# Patient Record
Sex: FEMALE | Race: BLACK OR AFRICAN AMERICAN | URBAN - METROPOLITAN AREA
[De-identification: names, ages, dates, MRNs, and addresses within clinical notes are randomized per-mention and may not be internally consistent; named-entity substitution may affect disease eponyms.]

---

## 2020-08-14 ENCOUNTER — ERX REFILL RESPONSE (OUTPATIENT)
Dept: URBAN - METROPOLITAN AREA TELEHEALTH 2 | Facility: TELEHEALTH | Age: 40
End: 2020-08-14

## 2020-08-14 ENCOUNTER — OFFICE VISIT (OUTPATIENT)
Dept: URBAN - METROPOLITAN AREA TELEHEALTH 2 | Facility: TELEHEALTH | Age: 40
End: 2020-08-14
Payer: MEDICARE

## 2020-08-14 ENCOUNTER — LAB OUTSIDE AN ENCOUNTER (OUTPATIENT)
Dept: URBAN - METROPOLITAN AREA TELEHEALTH 2 | Facility: TELEHEALTH | Age: 40
End: 2020-08-14

## 2020-08-14 DIAGNOSIS — Z91.89 COLON CANCER HIGH RISK: ICD-10-CM

## 2020-08-14 DIAGNOSIS — R11.2 NAUSEA & VOMITING: ICD-10-CM

## 2020-08-14 DIAGNOSIS — R10.84 ABDOMINAL PAIN, GENERALIZED: ICD-10-CM

## 2020-08-14 DIAGNOSIS — K50.80 CROHN'S DISEASE OF BOTH SMALL AND LARGE INTESTINE WITHOUT COMPLICATION: ICD-10-CM

## 2020-08-14 DIAGNOSIS — Z79.899 LONG-TERM USE OF IMMUNOSUPPRESSANT MEDICATION: ICD-10-CM

## 2020-08-14 DIAGNOSIS — R93.5 ABNORMAL MRI OF ABDOMEN: ICD-10-CM

## 2020-08-14 PROCEDURE — G9622 NO UNHEAL ETOH USER: HCPCS | Performed by: INTERNAL MEDICINE

## 2020-08-14 PROCEDURE — G8427 DOCREV CUR MEDS BY ELIG CLIN: HCPCS | Performed by: INTERNAL MEDICINE

## 2020-08-14 PROCEDURE — 1036F TOBACCO NON-USER: CPT | Performed by: INTERNAL MEDICINE

## 2020-08-14 PROCEDURE — G8420 CALC BMI NORM PARAMETERS: HCPCS | Performed by: INTERNAL MEDICINE

## 2020-08-14 PROCEDURE — 3017F COLORECTAL CA SCREEN DOC REV: CPT | Performed by: INTERNAL MEDICINE

## 2020-08-14 PROCEDURE — G8482 FLU IMMUNIZE ORDER/ADMIN: HCPCS | Performed by: INTERNAL MEDICINE

## 2020-08-14 PROCEDURE — G9903 PT SCRN TBCO ID AS NON USER: HCPCS | Performed by: INTERNAL MEDICINE

## 2020-08-14 PROCEDURE — 99215 OFFICE O/P EST HI 40 MIN: CPT | Performed by: INTERNAL MEDICINE

## 2020-08-14 RX ORDER — PROMETHAZINE HYDROCHLORIDE 25 MG/1
TAKE 1 TABLET (25 MG) BY ORAL ROUTE EVERY 6 HOURS AS NEEDED FOR 90 DAYS TABLET ORAL
Qty: 200 | Refills: 3 | Status: ACTIVE | COMMUNITY
Start: 2019-12-09 | End: 2020-12-03

## 2020-08-14 RX ORDER — OMEPRAZOLE 40 MG/1
1 CAPSULE 30 MINUTES BEFORE MORNING MEAL CAPSULE, DELAYED RELEASE ORAL BID
Qty: 60 | OUTPATIENT
Start: 2020-08-14

## 2020-08-14 RX ORDER — NORTRIPTYLINE HYDROCHLORIDE 25 MG/1
TAKE 2 CAPSULES BY ORAL ROUTE ONCE A DAY (AT BEDTIME) FOR 90 DAYS CAPSULE ORAL 1
Qty: 180 | Refills: 4 | Status: ACTIVE | COMMUNITY
Start: 2020-01-22 | End: 2021-04-16

## 2020-08-14 RX ORDER — SODIUM, POTASSIUM,MAG SULFATES 17.5-3.13G
354 ML SOLUTION, RECONSTITUTED, ORAL ORAL ONCE
Qty: 354 MILLILITER | Refills: 0 | OUTPATIENT
Start: 2020-08-14 | End: 2020-08-15

## 2020-08-14 RX ORDER — PREDNISONE 10 MG/1
2 TABLETS TABLET ORAL ONCE A DAY
Qty: 60 | Refills: 0 | OUTPATIENT
Start: 2020-08-14 | End: 2020-09-13

## 2020-08-14 RX ORDER — MORPHINE 10 MG/ML
5 DROPS TINCTURE ORAL
Qty: 600 ML | Refills: 0 | OUTPATIENT
Start: 2020-08-14

## 2020-08-14 RX ORDER — CYANOCOBALAMIN 1000 UG/ML
INJECT 1ML ONCE WEEK FOR 30 DAYS INJECTION INTRAMUSCULAR; SUBCUTANEOUS
Qty: 4 | Refills: 4 | Status: ACTIVE | COMMUNITY
Start: 2018-05-30 | End: 1900-01-01

## 2020-08-14 RX ORDER — OMEPRAZOLE 40 MG/1
1 CAPSULE 30 MINUTES BEFORE MORNING MEAL CAPSULE, DELAYED RELEASE ORAL BID
Qty: 60 | Refills: 0 | OUTPATIENT

## 2020-08-14 RX ORDER — DIPHENOXYLATE HYDROCHLORIDE AND ATROPINE SULFATE 2.5; .025 MG/1; MG/1
1 TABLET AS NEEDED TABLET ORAL
Qty: 120 | Refills: 3 | OUTPATIENT
Start: 2020-08-14

## 2020-08-14 RX ORDER — DIPHENOXYLATE HCL/ATROPINE 2.5-.025MG
TAKE 2 TABLETS BY ORAL ROUTE 3 TIMES A DAY FOR 90 DAYS TABLET ORAL
Qty: 540 | Refills: 3 | Status: ACTIVE | COMMUNITY
Start: 2020-04-29 | End: 2021-04-24

## 2020-08-14 RX ORDER — OMEPRAZOLE 40 MG/1
1 CAPSULE 30 MINUTES BEFORE MORNING MEAL BID ORALLY 90 CAPSULE, DELAYED RELEASE ORAL
Qty: 60 CAPSULE | Refills: 0 | OUTPATIENT

## 2020-08-14 NOTE — HPI-OTHER HISTORIES
. Ms Cota is a 38 y/o individual, with ileal CD, currently on Humira (every week currently since ), here for follow-up of her condition, here f/u. . She is referred by Dr. Brittanie Flannery. . She was diagnosed in  with sxs of vomiting.  She had first surgery at age 5 for bowel obstruction, intestinal obstruction was found and had resection. She had the next surgery in  at [place], after presenting with obstructions.  She had ileal resection performed at this time and was diagnosed with Crohn s disease.  At that time she was started on Remicade.  She used this for about 1 year, but had minimal improvement.  She was then started on Humira since .  This has worked for her. . Previously on 10/22/2018, she reports that she continues to get frequent hospitalizations with sxs of diarrhea, nausea, vomiting, and abdominal pain.  When she has a flare, she often loses weight.  She also has shortgut syndrome.  When she has a flare, she often takes prednisone.  Her last hospitalization was in 2018.  She reports that she has 2-3 BM s per day, but this can vary depending on what she eats.  She has diffuse abdominal pain, worse if she eats, nothing makes it better or worse other than time.  Has injection site pain with Humira.  Tincture of opium is way she maintains diarrheal sxs, without it, she has continuous diarrhea. . She had a colonoscopy 10/31/2018, which was normal. . Previously on 2018, she reports that she has had some post-procedure pain and some rectal bleeding.  She has significant amount of pain on thighs after injection of Humira. . Previously on 2019, she reports that she has daily nausea, but no flares.  Zofran and Phenergan helps her the most.  We attempted to switch to every other week Humira, however, she has developed joint swelling and pain. . Previously on 3/27/2019, she has severe diarrhea.  She developed shingles and we stopped her Humira for the past 2 weeks.  She has severe nerve pain on her right chest wall, started on gabapentin by doctor.  She has up to 15 BM with incontinence.  Minimal improvement with Lomotil and opium tincture has run out due to increased dosage requirement.  Significant stress, son had seizure and other son lacerated liver after falling off bike. . Previously on 2019, she reports that she was hospitalized for nausea, vomiting and diarrhea.  Her big issues is daily nausea, however, THC (helps with diarrhea) and CBD oil helps with nausea. . Previously on 2020, pt report that she has been noticing that her BP has been elevated at home checks.  She continues to have nausea and abdominal pain.  She has nausea on a daily basis and has lost weight because of this.  CBD oils is helping with nausea, however, it impairs her mental status and prevents her from being able to drive her car.  No diarrhea.  No joint swelling, doing well on Humira. . Previously on 2020, pt reports she stopped taking her THC (when kids stopped school).  However, she got severely dehydrated due to this.  She has lost 7 lbs (was having lot of nausea and diarrhea).  Was tested for c diff.  Was started on prednisone (by ER) and was able to taper off of this.  CBD oil helps with nausea. . Today on 2020, pt reports she had a severe flare, went to ER for severe and out of control pain, 10 on scale of 10.  Having severe radiation of sxs, nothing makes it better or worse.  Had associated nausea as well.  She was given 3 doses of IV dilaudid, steroids.  No heartburn.  Uncontrolled diarrhea.  She was given flagyl in the ER, but did not get a stool culture performed.  The flagyl has helped somewhat. . Sees pain management for pain. . Labs: 2020: hgb 11.8, Wbc 4.3, ast 13, alt 5, iron sat 49, quant-gold (re-draw), b12 490, ferritin 109,  2019: Hgb 12.4, Ast 12, Alt 11, Cr 0.97 10/26/2018: Hgb 13.5, Cr 1.03, Ast 14, Alt 9, Iron sat 46, Ferritin 124; B12 >2000; vit D 12.2 Crp 0.2; esr 2 Humira 32  Quant-gold neg; hep B immune . FH: Aunt () with CD, brother with UC SH: No etoh/cig; disabled lives in [facility] PHM: LESTER, 2 small bowel resections, endometriosis, and scar tissue  . 10/31/2018: (Dr. Seals) - Normal mucosa in the recto-sigmoid colon, in the sigmoid colon, in the descending colon, at the splenic flexure, in the transverse colon, at the hepatic flexure and in the ascending colon.  Biopsied. - The examined portion of the ileum was normal.  Biopsied. - The ileocecal valve is normal.  Biopsied. . 2016: Colonoscopy was normal with normal biopsies from ileum and colon .

## 2020-08-21 ENCOUNTER — TELEPHONE ENCOUNTER (OUTPATIENT)
Dept: URBAN - METROPOLITAN AREA CLINIC 92 | Facility: CLINIC | Age: 40
End: 2020-08-21

## 2020-09-17 ENCOUNTER — OFFICE VISIT (OUTPATIENT)
Dept: URBAN - METROPOLITAN AREA SURGERY CENTER 18 | Facility: SURGERY CENTER | Age: 40
End: 2020-09-17
Payer: MEDICARE

## 2020-09-17 ENCOUNTER — TELEPHONE ENCOUNTER (OUTPATIENT)
Dept: URBAN - METROPOLITAN AREA CLINIC 92 | Facility: CLINIC | Age: 40
End: 2020-09-17

## 2020-09-17 DIAGNOSIS — K29.30 CHRONIC SUPERFICIAL GASTRITIS WITHOUT BLEEDING: ICD-10-CM

## 2020-09-17 DIAGNOSIS — K50.10 CROHN'S COLITIS: ICD-10-CM

## 2020-09-17 PROCEDURE — 45380 COLONOSCOPY AND BIOPSY: CPT | Performed by: INTERNAL MEDICINE

## 2020-09-17 PROCEDURE — 43239 EGD BIOPSY SINGLE/MULTIPLE: CPT | Performed by: INTERNAL MEDICINE

## 2020-09-17 PROCEDURE — G8907 PT DOC NO EVENTS ON DISCHARG: HCPCS | Performed by: INTERNAL MEDICINE

## 2020-09-17 PROCEDURE — G9937 DIG OR SURV COLSCO: HCPCS | Performed by: INTERNAL MEDICINE

## 2020-09-17 RX ORDER — DICYCLOMINE HYDROCHLORIDE 20 MG/1
2 CAPSULES TABLET ORAL
Qty: 720 CAPSULE | Refills: 4 | OUTPATIENT
Start: 2020-09-17 | End: 2021-12-10

## 2020-09-17 RX ORDER — DIPHENOXYLATE HCL/ATROPINE 2.5-.025MG
TAKE 2 TABLETS BY ORAL ROUTE 3 TIMES A DAY FOR 90 DAYS TABLET ORAL
Qty: 540 | Refills: 3 | Status: ACTIVE | COMMUNITY
Start: 2020-04-29 | End: 2021-04-24

## 2020-09-17 RX ORDER — OMEPRAZOLE 40 MG/1
1 CAPSULE 30 MINUTES BEFORE MORNING MEAL BID ORALLY 90 CAPSULE, DELAYED RELEASE ORAL
Qty: 60 CAPSULE | Refills: 0 | Status: ACTIVE | COMMUNITY

## 2020-09-17 RX ORDER — PROMETHAZINE HYDROCHLORIDE 25 MG/1
TAKE 1 TABLET (25 MG) BY ORAL ROUTE EVERY 6 HOURS AS NEEDED FOR 90 DAYS TABLET ORAL
Qty: 200 | Refills: 3 | Status: ACTIVE | COMMUNITY
Start: 2019-12-09 | End: 2020-12-03

## 2020-09-17 RX ORDER — MORPHINE 10 MG/ML
5 DROPS TINCTURE ORAL
Qty: 600 ML | Refills: 0 | Status: ACTIVE | COMMUNITY
Start: 2020-08-14

## 2020-09-17 RX ORDER — NORTRIPTYLINE HYDROCHLORIDE 25 MG/1
TAKE 2 CAPSULES BY ORAL ROUTE ONCE A DAY (AT BEDTIME) FOR 90 DAYS CAPSULE ORAL 1
Qty: 180 | Refills: 4 | Status: ACTIVE | COMMUNITY
Start: 2020-01-22 | End: 2021-04-16

## 2020-09-17 RX ORDER — DIPHENOXYLATE HYDROCHLORIDE AND ATROPINE SULFATE 2.5; .025 MG/1; MG/1
1 TABLET AS NEEDED TABLET ORAL
Qty: 120 | Refills: 3 | Status: ACTIVE | COMMUNITY
Start: 2020-08-14

## 2020-09-17 RX ORDER — MORPHINE 10 MG/ML
5 DROPS TINCTURE ORAL
Qty: 600 ML | Refills: 0
Start: 2020-08-14

## 2020-09-17 RX ORDER — OMEPRAZOLE 40 MG/1
1 CAPSULE CAPSULE, DELAYED RELEASE ORAL BID
Qty: 180 | Refills: 4 | OUTPATIENT
Start: 2020-09-17

## 2020-09-17 RX ORDER — CYANOCOBALAMIN 1000 UG/ML
INJECT 1ML ONCE WEEK FOR 30 DAYS INJECTION INTRAMUSCULAR; SUBCUTANEOUS
Qty: 4 | Refills: 4 | Status: ACTIVE | COMMUNITY
Start: 2018-05-30 | End: 1900-01-01

## 2020-09-18 ENCOUNTER — WEB ENCOUNTER (OUTPATIENT)
Dept: URBAN - METROPOLITAN AREA CLINIC 96 | Facility: CLINIC | Age: 40
End: 2020-09-18

## 2020-09-18 ENCOUNTER — OFFICE VISIT (OUTPATIENT)
Dept: URBAN - METROPOLITAN AREA TELEHEALTH 2 | Facility: TELEHEALTH | Age: 40
End: 2020-09-18
Payer: MEDICARE

## 2020-09-18 ENCOUNTER — WEB ENCOUNTER (OUTPATIENT)
Dept: URBAN - METROPOLITAN AREA CLINIC 98 | Facility: CLINIC | Age: 40
End: 2020-09-18

## 2020-09-18 DIAGNOSIS — R10.9 ABDOMINAL PAIN: ICD-10-CM

## 2020-09-18 DIAGNOSIS — E86.0 DEHYDRATION: ICD-10-CM

## 2020-09-18 DIAGNOSIS — R11.2 NAUSEA & VOMITING: ICD-10-CM

## 2020-09-18 DIAGNOSIS — K50.80 CROHN'S DISEASE OF BOTH SMALL AND LARGE INTESTINE WITHOUT COMPLICATION: ICD-10-CM

## 2020-09-18 PROCEDURE — 99214 OFFICE O/P EST MOD 30 MIN: CPT | Performed by: INTERNAL MEDICINE

## 2020-09-18 PROCEDURE — G9903 PT SCRN TBCO ID AS NON USER: HCPCS | Performed by: INTERNAL MEDICINE

## 2020-09-18 PROCEDURE — 3017F COLORECTAL CA SCREEN DOC REV: CPT | Performed by: INTERNAL MEDICINE

## 2020-09-18 PROCEDURE — 1036F TOBACCO NON-USER: CPT | Performed by: INTERNAL MEDICINE

## 2020-09-18 PROCEDURE — G8427 DOCREV CUR MEDS BY ELIG CLIN: HCPCS | Performed by: INTERNAL MEDICINE

## 2020-09-18 PROCEDURE — G8482 FLU IMMUNIZE ORDER/ADMIN: HCPCS | Performed by: INTERNAL MEDICINE

## 2020-09-18 PROCEDURE — G9622 NO UNHEAL ETOH USER: HCPCS | Performed by: INTERNAL MEDICINE

## 2020-09-18 PROCEDURE — G8420 CALC BMI NORM PARAMETERS: HCPCS | Performed by: INTERNAL MEDICINE

## 2020-09-18 RX ORDER — OMEPRAZOLE 40 MG/1
1 CAPSULE 30 MINUTES BEFORE MORNING MEAL BID ORALLY 90 CAPSULE, DELAYED RELEASE ORAL
Qty: 60 CAPSULE | Refills: 0 | Status: ACTIVE | COMMUNITY

## 2020-09-18 RX ORDER — DIPHENOXYLATE HYDROCHLORIDE AND ATROPINE SULFATE 2.5; .025 MG/1; MG/1
1 TABLET AS NEEDED TABLET ORAL
Qty: 120 | Refills: 3 | Status: ACTIVE | COMMUNITY
Start: 2020-08-14

## 2020-09-18 RX ORDER — NORTRIPTYLINE HYDROCHLORIDE 25 MG/1
1 CAPSULE CAPSULE ORAL QPM
Qty: 30 | Refills: 4 | OUTPATIENT
Start: 2020-09-18

## 2020-09-18 RX ORDER — DIPHENOXYLATE HYDROCHLORIDE AND ATROPINE SULFATE 2.5; .025 MG/1; MG/1
1 TABLET AS NEEDED TABLET ORAL
Qty: 120 | Refills: 3 | OUTPATIENT

## 2020-09-18 RX ORDER — MORPHINE 10 MG/ML
5 DROPS TINCTURE ORAL
Qty: 600 ML | Refills: 0 | Status: ACTIVE | COMMUNITY
Start: 2020-08-14

## 2020-09-18 RX ORDER — MORPHINE 10 MG/ML
5 DROPS TINCTURE ORAL
Qty: 600 ML | Refills: 0 | OUTPATIENT

## 2020-09-18 RX ORDER — PROMETHAZINE HYDROCHLORIDE 25 MG/1
TAKE 1 TABLET (25 MG) BY ORAL ROUTE EVERY 6 HOURS AS NEEDED FOR 90 DAYS TABLET ORAL
Qty: 200 | Refills: 3 | Status: ACTIVE | COMMUNITY
Start: 2019-12-09 | End: 2020-12-03

## 2020-09-18 RX ORDER — DICYCLOMINE HYDROCHLORIDE 20 MG/1
2 CAPSULES TABLET ORAL
Qty: 720 CAPSULE | Refills: 4 | Status: ACTIVE | COMMUNITY
Start: 2020-09-17 | End: 2021-12-10

## 2020-09-18 RX ORDER — OMEPRAZOLE 40 MG/1
1 CAPSULE CAPSULE, DELAYED RELEASE ORAL BID
Qty: 180 | Refills: 4 | Status: ACTIVE | COMMUNITY
Start: 2020-09-17

## 2020-09-18 RX ORDER — NORTRIPTYLINE HYDROCHLORIDE 25 MG/1
TAKE 2 CAPSULES BY ORAL ROUTE ONCE A DAY (AT BEDTIME) FOR 90 DAYS CAPSULE ORAL 1
Qty: 180 | Refills: 4 | Status: ACTIVE | COMMUNITY
Start: 2020-01-22 | End: 2021-04-16

## 2020-09-18 RX ORDER — CYANOCOBALAMIN 1000 UG/ML
INJECT 1ML ONCE WEEK FOR 30 DAYS INJECTION INTRAMUSCULAR; SUBCUTANEOUS
Qty: 4 | Refills: 4 | Status: ACTIVE | COMMUNITY
Start: 2018-05-30 | End: 1900-01-01

## 2020-09-18 RX ORDER — OMEPRAZOLE 40 MG/1
1 CAPSULE ORALLY BID CAPSULE, DELAYED RELEASE ORAL
Qty: 180 | Refills: 4 | OUTPATIENT

## 2020-09-18 RX ORDER — DIPHENOXYLATE HCL/ATROPINE 2.5-.025MG
TAKE 2 TABLETS BY ORAL ROUTE 3 TIMES A DAY FOR 90 DAYS TABLET ORAL
Qty: 540 | Refills: 3 | Status: ACTIVE | COMMUNITY
Start: 2020-04-29 | End: 2021-04-24

## 2020-09-18 NOTE — HPI-OTHER HISTORIES
. Ms Cota is a 41 y/o individual, with ileal CD, currently on Humira (every week currently since ), here for follow-up of her condition, here f/u. . She is referred by Dr. Brittanie Flannery. . She was diagnosed in  with sxs of vomiting.  She had first surgery at age 5 for bowel obstruction, intestinal obstruction was found and had resection. She had the next surgery in  at [place], after presenting with obstructions.  She had ileal resection performed at this time and was diagnosed with Crohn s disease.  At that time she was started on Remicade.  She used this for about 1 year, but had minimal improvement.  She was then started on Humira since .  This has worked for her. . Previously on 10/22/2018, she reports that she continues to get frequent hospitalizations with sxs of diarrhea, nausea, vomiting, and abdominal pain.  When she has a flare, she often loses weight.  She also has shortgut syndrome.  When she has a flare, she often takes prednisone.  Her last hospitalization was in 2018.  She reports that she has 2-3 BM s per day, but this can vary depending on what she eats.  She has diffuse abdominal pain, worse if she eats, nothing makes it better or worse other than time.  Has injection site pain with Humira.  Tincture of opium is way she maintains diarrheal sxs, without it, she has continuous diarrhea. . She had a colonoscopy 10/31/2018, which was normal. . Previously on 2018, she reports that she has had some post-procedure pain and some rectal bleeding.  She has significant amount of pain on thighs after injection of Humira. . Previously on 2019, she reports that she has daily nausea, but no flares.  Zofran and Phenergan helps her the most.  We attempted to switch to every other week Humira, however, she has developed joint swelling and pain. . Previously on 3/27/2019, she has severe diarrhea.  She developed shingles and we stopped her Humira for the past 2 weeks.  She has severe nerve pain on her right chest wall, started on gabapentin by doctor.  She has up to 15 BM with incontinence.  Minimal improvement with Lomotil and opium tincture has run out due to increased dosage requirement.  Significant stress, son had seizure and other son lacerated liver after falling off bike. . Previously on 2019, she reports that she was hospitalized for nausea, vomiting and diarrhea.  Her big issues is daily nausea, however, THC (helps with diarrhea) and CBD oil helps with nausea. . Previously on 2020, pt report that she has been noticing that her BP has been elevated at home checks.  She continues to have nausea and abdominal pain.  She has nausea on a daily basis and has lost weight because of this.  CBD oils is helping with nausea, however, it impairs her mental status and prevents her from being able to drive her car.  No diarrhea.  No joint swelling, doing well on Humira. . Previously on 2020, pt reports she stopped taking her THC (when kids stopped school).  However, she got severely dehydrated due to this.  She has lost 7 lbs (was having lot of nausea and diarrhea).  Was tested for c diff.  Was started on prednisone (by ER) and was able to taper off of this.  CBD oil helps with nausea. . Previously on 2020, pt reports she had a severe flare, went to ER for severe and out of control pain, 10 on scale of 10.  Having severe radiation of sxs, nothing makes it better or worse.  Had associated nausea as well.  She was given 3 doses of IV dilaudid, steroids.  No heartburn.  Uncontrolled diarrhea.  She was given flagyl in the ER, but did not get a stool culture performed.  The flagyl has helped somewhat. . Today on 2020, pt reports that after procedure she had severe onset of abdominal pain, nausea and vomiting.  She went to ER, they did CT scan, which was negative.  They could not get IV access on her, due to the dehydration.   . Sees pain management for pain. . Labs: 2020: hgb 11.8, Wbc 4.3, ast 13, alt 5, iron sat 49, quant-gold (re-draw), b12 490, ferritin 109,  2019: Hgb 12.4, Ast 12, Alt 11, Cr 0.97 10/26/2018: Hgb 13.5, Cr 1.03, Ast 14, Alt 9, Iron sat 46, Ferritin 124; B12 >2000; vit D 12.2 Crp 0.2; esr 2 Humira 32  Quant-gold neg; hep B immune . FH: Aunt () with CD, brother with UC SH: No etoh/cig; disabled lives in [facility] PHM: LESTER, 2 small bowel resections, endometriosis, and scar tissue  . 10/31/2018: (Dr. Seals) - Normal mucosa in the recto-sigmoid colon, in the sigmoid colon, in the descending colon, at the splenic flexure, in the transverse colon, at the hepatic flexure and in the ascending colon.  Biopsied. - The examined portion of the ileum was normal.  Biopsied. - The ileocecal valve is normal.  Biopsied. . 2016: Colonoscopy was normal with normal biopsies from ileum and colon .

## 2020-09-21 ENCOUNTER — TELEPHONE ENCOUNTER (OUTPATIENT)
Dept: URBAN - METROPOLITAN AREA CLINIC 92 | Facility: CLINIC | Age: 40
End: 2020-09-21

## 2020-09-21 RX ORDER — OMEPRAZOLE 40 MG/1
1 CAPSULE 30 MINUTES BEFORE MORNING MEAL BID ORALLY 90 CAPSULE, DELAYED RELEASE ORAL
Qty: 60 CAPSULE | Refills: 0 | Status: ACTIVE | COMMUNITY

## 2020-09-21 RX ORDER — PROMETHAZINE HYDROCHLORIDE 25 MG/1
TAKE 1 TABLET (25 MG) BY ORAL ROUTE EVERY 6 HOURS AS NEEDED FOR 90 DAYS TABLET ORAL
Qty: 200 | Refills: 3 | Status: ACTIVE | COMMUNITY
Start: 2019-12-09 | End: 2020-12-03

## 2020-09-21 RX ORDER — OMEPRAZOLE 40 MG/1
1 CAPSULE ORALLY BID CAPSULE, DELAYED RELEASE ORAL
Qty: 180 | Refills: 4 | Status: ACTIVE | COMMUNITY

## 2020-09-21 RX ORDER — NORTRIPTYLINE HYDROCHLORIDE 25 MG/1
TAKE 2 CAPSULES BY ORAL ROUTE ONCE A DAY (AT BEDTIME) FOR 90 DAYS CAPSULE ORAL 1
Qty: 180 | Refills: 4 | Status: ACTIVE | COMMUNITY
Start: 2020-01-22 | End: 2021-04-16

## 2020-09-21 RX ORDER — OMEPRAZOLE 40 MG/1
1 CAPSULE CAPSULE, DELAYED RELEASE ORAL BID
Qty: 60 CAPSULE | Refills: 11
Start: 2020-09-17

## 2020-09-21 RX ORDER — PREDNISONE 10 MG/1
2 TABLETS TABLET ORAL ONCE A DAY
Qty: 60 | Refills: 0 | OUTPATIENT
Start: 2020-09-21 | End: 2020-10-21

## 2020-09-21 RX ORDER — DICYCLOMINE HYDROCHLORIDE 20 MG/1
2 CAPSULES TABLET ORAL
Qty: 720 CAPSULE | Refills: 4 | Status: ACTIVE | COMMUNITY
Start: 2020-09-17 | End: 2021-12-10

## 2020-09-21 RX ORDER — DIPHENOXYLATE HYDROCHLORIDE AND ATROPINE SULFATE 2.5; .025 MG/1; MG/1
1 TABLET AS NEEDED TABLET ORAL
Qty: 120 | Refills: 3 | Status: ACTIVE | COMMUNITY

## 2020-09-21 RX ORDER — OMEPRAZOLE 40 MG/1
1 CAPSULE CAPSULE, DELAYED RELEASE ORAL BID
Qty: 180 | Refills: 4 | Status: ACTIVE | COMMUNITY
Start: 2020-09-17

## 2020-09-21 RX ORDER — MORPHINE 10 MG/ML
5 DROPS TINCTURE ORAL
Qty: 600 ML | Refills: 0 | Status: ACTIVE | COMMUNITY

## 2020-09-21 RX ORDER — NORTRIPTYLINE HYDROCHLORIDE 25 MG/1
1 CAPSULE CAPSULE ORAL QPM
Qty: 30 | Refills: 4 | Status: ACTIVE | COMMUNITY
Start: 2020-09-18

## 2020-09-21 RX ORDER — CYANOCOBALAMIN 1000 UG/ML
INJECT 1ML ONCE WEEK FOR 30 DAYS INJECTION INTRAMUSCULAR; SUBCUTANEOUS
Qty: 4 | Refills: 4 | Status: ACTIVE | COMMUNITY
Start: 2018-05-30 | End: 1900-01-01

## 2020-09-21 RX ORDER — DIPHENOXYLATE HCL/ATROPINE 2.5-.025MG
TAKE 2 TABLETS BY ORAL ROUTE 3 TIMES A DAY FOR 90 DAYS TABLET ORAL
Qty: 540 | Refills: 3 | Status: ACTIVE | COMMUNITY
Start: 2020-04-29 | End: 2021-04-24

## 2020-09-23 ENCOUNTER — TELEPHONE ENCOUNTER (OUTPATIENT)
Dept: URBAN - METROPOLITAN AREA CLINIC 92 | Facility: CLINIC | Age: 40
End: 2020-09-23

## 2020-09-23 RX ORDER — OMEPRAZOLE 40 MG/1
1 CAPSULE CAPSULE, DELAYED RELEASE ORAL BID
Qty: 180 | Refills: 4
Start: 2020-09-17

## 2020-10-28 ENCOUNTER — OFFICE VISIT (OUTPATIENT)
Dept: URBAN - METROPOLITAN AREA TELEHEALTH 2 | Facility: TELEHEALTH | Age: 40
End: 2020-10-28
Payer: MEDICARE

## 2020-10-28 ENCOUNTER — TELEPHONE ENCOUNTER (OUTPATIENT)
Dept: URBAN - METROPOLITAN AREA CLINIC 92 | Facility: CLINIC | Age: 40
End: 2020-10-28

## 2020-10-28 DIAGNOSIS — R11.2 NAUSEA & VOMITING: ICD-10-CM

## 2020-10-28 DIAGNOSIS — R10.84 ABDOMINAL CRAMPING, GENERALIZED: ICD-10-CM

## 2020-10-28 DIAGNOSIS — K50.80 CROHN'S DISEASE OF BOTH SMALL AND LARGE INTESTINE WITHOUT COMPLICATION: ICD-10-CM

## 2020-10-28 DIAGNOSIS — R10.9 ABDOMINAL PAIN: ICD-10-CM

## 2020-10-28 DIAGNOSIS — Z09 FOLLOW UP: ICD-10-CM

## 2020-10-28 DIAGNOSIS — E86.0 DEHYDRATION: ICD-10-CM

## 2020-10-28 DIAGNOSIS — Z79.899 LONG-TERM USE OF IMMUNOSUPPRESSANT MEDICATION: ICD-10-CM

## 2020-10-28 PROCEDURE — 99213 OFFICE O/P EST LOW 20 MIN: CPT | Performed by: INTERNAL MEDICINE

## 2020-10-28 PROCEDURE — G9903 PT SCRN TBCO ID AS NON USER: HCPCS | Performed by: INTERNAL MEDICINE

## 2020-10-28 PROCEDURE — 3017F COLORECTAL CA SCREEN DOC REV: CPT | Performed by: INTERNAL MEDICINE

## 2020-10-28 PROCEDURE — G8427 DOCREV CUR MEDS BY ELIG CLIN: HCPCS | Performed by: INTERNAL MEDICINE

## 2020-10-28 PROCEDURE — 99215 OFFICE O/P EST HI 40 MIN: CPT | Performed by: INTERNAL MEDICINE

## 2020-10-28 PROCEDURE — G8482 FLU IMMUNIZE ORDER/ADMIN: HCPCS | Performed by: INTERNAL MEDICINE

## 2020-10-28 PROCEDURE — G8420 CALC BMI NORM PARAMETERS: HCPCS | Performed by: INTERNAL MEDICINE

## 2020-10-28 PROCEDURE — G9622 NO UNHEAL ETOH USER: HCPCS | Performed by: INTERNAL MEDICINE

## 2020-10-28 PROCEDURE — 1036F TOBACCO NON-USER: CPT | Performed by: INTERNAL MEDICINE

## 2020-10-28 RX ORDER — OMEPRAZOLE 40 MG/1
1 CAPSULE 30 MINUTES BEFORE MORNING MEAL BID ORALLY 90 CAPSULE, DELAYED RELEASE ORAL
Qty: 60 CAPSULE | Refills: 0 | Status: ACTIVE | COMMUNITY

## 2020-10-28 RX ORDER — OMEPRAZOLE 40 MG/1
1 CAPSULE ORALLY BID CAPSULE, DELAYED RELEASE ORAL
Qty: 180 | Refills: 4 | OUTPATIENT

## 2020-10-28 RX ORDER — MORPHINE 10 MG/ML
5 DROPS TINCTURE ORAL
Qty: 600 ML | Refills: 0 | OUTPATIENT

## 2020-10-28 RX ORDER — PANCRELIPASE 36000; 180000; 114000 [USP'U]/1; [USP'U]/1; [USP'U]/1
AS DIRECTED CAPSULE, DELAYED RELEASE PELLETS ORAL
Qty: 250 | Refills: 0 | OUTPATIENT
Start: 2020-10-28 | End: 2020-11-26

## 2020-10-28 RX ORDER — CYANOCOBALAMIN 1000 UG/ML
INJECT 1ML ONCE WEEK FOR 30 DAYS INJECTION INTRAMUSCULAR; SUBCUTANEOUS
Qty: 4 | Refills: 4 | Status: ACTIVE | COMMUNITY
Start: 2018-05-30 | End: 1900-01-01

## 2020-10-28 RX ORDER — OMEPRAZOLE 40 MG/1
1 CAPSULE CAPSULE, DELAYED RELEASE ORAL BID
Qty: 180 | Refills: 4 | Status: ACTIVE | COMMUNITY
Start: 2020-09-17

## 2020-10-28 RX ORDER — DICYCLOMINE HYDROCHLORIDE 20 MG/1
2 CAPSULES TABLET ORAL
Qty: 720 CAPSULE | Refills: 4 | Status: ACTIVE | COMMUNITY
Start: 2020-09-17 | End: 2021-12-10

## 2020-10-28 RX ORDER — NORTRIPTYLINE HYDROCHLORIDE 25 MG/1
1 CAPSULE CAPSULE ORAL QPM
Qty: 30 | Refills: 4 | OUTPATIENT

## 2020-10-28 RX ORDER — CYCLOBENZAPRINE HYDROCHLORIDE 5 MG/1
1 TABLET AT BEDTIME AS NEEDED TABLET, FILM COATED ORAL ONCE A DAY
Qty: 30 TABLET | OUTPATIENT
Start: 2020-10-28 | End: 2020-11-26

## 2020-10-28 RX ORDER — NORTRIPTYLINE HYDROCHLORIDE 25 MG/1
1 CAPSULE CAPSULE ORAL QPM
Qty: 30 | Refills: 4 | Status: ACTIVE | COMMUNITY
Start: 2020-09-18

## 2020-10-28 RX ORDER — DIPHENOXYLATE HCL/ATROPINE 2.5-.025MG
TAKE 2 TABLETS BY ORAL ROUTE 3 TIMES A DAY FOR 90 DAYS TABLET ORAL
Qty: 540 | Refills: 3 | Status: ACTIVE | COMMUNITY
Start: 2020-04-29 | End: 2021-04-24

## 2020-10-28 RX ORDER — NORTRIPTYLINE HYDROCHLORIDE 25 MG/1
TAKE 2 CAPSULES BY ORAL ROUTE ONCE A DAY (AT BEDTIME) FOR 90 DAYS CAPSULE ORAL 1
Qty: 180 | Refills: 4 | Status: ACTIVE | COMMUNITY
Start: 2020-01-22 | End: 2021-04-16

## 2020-10-28 RX ORDER — MORPHINE 10 MG/ML
5 DROPS TINCTURE ORAL
Qty: 600 ML | Refills: 0 | Status: ACTIVE | COMMUNITY

## 2020-10-28 RX ORDER — DIPHENOXYLATE HYDROCHLORIDE AND ATROPINE SULFATE 2.5; .025 MG/1; MG/1
1 TABLET AS NEEDED TABLET ORAL
Qty: 120 | Refills: 3 | OUTPATIENT

## 2020-10-28 RX ORDER — PROMETHAZINE HYDROCHLORIDE 25 MG/1
TAKE 1 TABLET (25 MG) BY ORAL ROUTE EVERY 6 HOURS AS NEEDED FOR 90 DAYS TABLET ORAL
Qty: 200 | Refills: 3 | Status: ACTIVE | COMMUNITY
Start: 2019-12-09 | End: 2020-12-03

## 2020-10-28 RX ORDER — OMEPRAZOLE 40 MG/1
1 CAPSULE ORALLY BID CAPSULE, DELAYED RELEASE ORAL
Qty: 180 | Refills: 4 | Status: ACTIVE | COMMUNITY

## 2020-10-28 RX ORDER — DIPHENOXYLATE HYDROCHLORIDE AND ATROPINE SULFATE 2.5; .025 MG/1; MG/1
1 TABLET AS NEEDED TABLET ORAL
Qty: 120 | Refills: 3 | Status: ACTIVE | COMMUNITY

## 2020-10-28 NOTE — HPI-TODAY'S VISIT:
. Ms Cota is a 38 y/o individual, with ileal CD, currently on Humira (every week currently since ), here for follow-up of her condition, here f/u. . She is referred by Dr. Brittanie Flannery. . She was diagnosed in  with sxs of vomiting.  She had first surgery at age 5 for bowel obstruction, intestinal obstruction was found and had resection. She had the next surgery in  at [place], after presenting with obstructions.  She had ileal resection performed at this time and was diagnosed with Crohn s disease.  At that time she was started on Remicade.  She used this for about 1 year, but had minimal improvement.  She was then started on Humira since .  This has worked for her. . Previously on 10/22/2018, she reports that she continues to get frequent hospitalizations with sxs of diarrhea, nausea, vomiting, and abdominal pain.  When she has a flare, she often loses weight.  She also has shortgut syndrome.  When she has a flare, she often takes prednisone.  Her last hospitalization was in 2018.  She reports that she has 2-3 BM s per day, but this can vary depending on what she eats.  She has diffuse abdominal pain, worse if she eats, nothing makes it better or worse other than time.  Has injection site pain with Humira.  Tincture of opium is way she maintains diarrheal sxs, without it, she has continuous diarrhea. . She had a colonoscopy 10/31/2018, which was normal. . Previously on 2018, she reports that she has had some post-procedure pain and some rectal bleeding.  She has significant amount of pain on thighs after injection of Humira. . Previously on 2019, she reports that she has daily nausea, but no flares.  Zofran and Phenergan helps her the most.  We attempted to switch to every other week Humira, however, she has developed joint swelling and pain. . Previously on 3/27/2019, she has severe diarrhea.  She developed shingles and we stopped her Humira for the past 2 weeks.  She has severe nerve pain on her right chest wall, started on gabapentin by doctor.  She has up to 15 BM with incontinence.  Minimal improvement with Lomotil and opium tincture has run out due to increased dosage requirement.  Significant stress, son had seizure and other son lacerated liver after falling off bike. . Previously on 2019, she reports that she was hospitalized for nausea, vomiting and diarrhea.  Her big issues is daily nausea, however, THC (helps with diarrhea) and CBD oil helps with nausea. . Previously on 2020, pt report that she has been noticing that her BP has been elevated at home checks.  She continues to have nausea and abdominal pain.  She has nausea on a daily basis and has lost weight because of this.  CBD oils is helping with nausea, however, it impairs her mental status and prevents her from being able to drive her car.  No diarrhea.  No joint swelling, doing well on Humira. . Previously on 2020, pt reports she stopped taking her THC (when kids stopped school).  However, she got severely dehydrated due to this.  She has lost 7 lbs (was having lot of nausea and diarrhea).  Was tested for c diff.  Was started on prednisone (by ER) and was able to taper off of this.  CBD oil helps with nausea. . Previously on 2020, pt reports she had a severe flare, went to ER for severe and out of control pain, 10 on scale of 10.  Having severe radiation of sxs, nothing makes it better or worse.  Had associated nausea as well.  She was given 3 doses of IV dilaudid, steroids.  No heartburn.  Uncontrolled diarrhea.  She was given flagyl in the ER, but did not get a stool culture performed.  The flagyl has helped somewhat. . Previously on 2020, pt reports that after procedure she had severe onset of abdominal pain, nausea and vomiting.  She went to ER, they did CT scan, which was negative.  They could not get IV access on her, due to the dehydration.   . Today on 10/28/2020, pt reports she has continued to lose weight since the past month.  She has good days and bad days.  Has not been taking the CBD oil lately due to difficulty in getting it (due to COVID).   . Sees pain management for pain. . Labs: 2020: hgb 11.8, Wbc 4.3, ast 13, alt 5, iron sat 49, quant-gold (re-draw), b12 490, ferritin 109,  2019: Hgb 12.4, Ast 12, Alt 11, Cr 0.97 10/26/2018: Hgb 13.5, Cr 1.03, Ast 14, Alt 9, Iron sat 46, Ferritin 124; B12 >2000; vit D 12.2 Crp 0.2; esr 2 Humira 32  Quant-gold neg; hep B immune . FH: Aunt () with CD, brother with UC SH: No etoh/cig; disabled lives in [facility] PHM: LESTER, 2 small bowel resections, endometriosis, and scar tissue  . 2020: A Gastric, biopsy Mild chronic gastritis. No Helicobacter pylori microorganisms identified with a special stain. . B Terminal ileum, biopsy Small bowel mucosa with no significant histopathology. No histologic evidence of active or chronic ileitis.  C Random colon, biopsy Colonic mucosa with no significant histopathology. No histologic evidence of active, chronic or microscopic colitis .  10/31/2018: (Dr. Seals) - Normal mucosa in the recto-sigmoid colon, in the sigmoid colon, in the descending colon, at the splenic flexure, in the transverse colon, at the hepatic flexure and in the ascending colon.  Biopsied. - The examined portion of the ileum was normal.  Biopsied. - The ileocecal valve is normal.  Biopsied. . : Colonoscopy was normal with normal biopsies from ileum and colon .

## 2020-11-02 ENCOUNTER — TELEPHONE ENCOUNTER (OUTPATIENT)
Dept: URBAN - METROPOLITAN AREA CLINIC 92 | Facility: CLINIC | Age: 40
End: 2020-11-02

## 2020-11-03 ENCOUNTER — TELEPHONE ENCOUNTER (OUTPATIENT)
Dept: URBAN - METROPOLITAN AREA CLINIC 92 | Facility: CLINIC | Age: 40
End: 2020-11-03

## 2020-11-03 RX ORDER — MORPHINE 10 MG/ML
5 DROPS TINCTURE ORAL
Qty: 600 ML | Refills: 0

## 2020-11-03 RX ORDER — DIPHENOXYLATE HYDROCHLORIDE AND ATROPINE SULFATE 2.5; .025 MG/1; MG/1
1 TABLET AS NEEDED TABLET ORAL
Qty: 120 | Refills: 3

## 2020-11-05 ENCOUNTER — TELEPHONE ENCOUNTER (OUTPATIENT)
Dept: URBAN - METROPOLITAN AREA CLINIC 92 | Facility: CLINIC | Age: 40
End: 2020-11-05

## 2020-11-06 ENCOUNTER — TELEPHONE ENCOUNTER (OUTPATIENT)
Dept: URBAN - METROPOLITAN AREA CLINIC 92 | Facility: CLINIC | Age: 40
End: 2020-11-06

## 2020-11-07 ENCOUNTER — WEB ENCOUNTER (OUTPATIENT)
Dept: URBAN - METROPOLITAN AREA CLINIC 98 | Facility: CLINIC | Age: 40
End: 2020-11-07

## 2020-11-08 ENCOUNTER — WEB ENCOUNTER (OUTPATIENT)
Dept: URBAN - METROPOLITAN AREA CLINIC 96 | Facility: CLINIC | Age: 40
End: 2020-11-08

## 2020-11-08 ENCOUNTER — WEB ENCOUNTER (OUTPATIENT)
Dept: URBAN - METROPOLITAN AREA CLINIC 98 | Facility: CLINIC | Age: 40
End: 2020-11-08

## 2020-11-16 ENCOUNTER — TELEPHONE ENCOUNTER (OUTPATIENT)
Dept: URBAN - METROPOLITAN AREA CLINIC 92 | Facility: CLINIC | Age: 40
End: 2020-11-16

## 2021-01-07 ENCOUNTER — ERX REFILL RESPONSE (OUTPATIENT)
Age: 41
End: 2021-01-07

## 2021-01-07 RX ORDER — ADALIMUMAB 40MG/0.4ML
INJECT 0.4 ML UNDER THE SKIN ONCE A WEEK (WEEKLY DOSING) KIT SUBCUTANEOUS
Qty: 2 | Refills: 11

## 2021-01-14 ENCOUNTER — TELEPHONE ENCOUNTER (OUTPATIENT)
Dept: URBAN - METROPOLITAN AREA CLINIC 92 | Facility: CLINIC | Age: 41
End: 2021-01-14

## 2021-02-12 ENCOUNTER — TELEPHONE ENCOUNTER (OUTPATIENT)
Dept: URBAN - METROPOLITAN AREA CLINIC 96 | Facility: CLINIC | Age: 41
End: 2021-02-12

## 2021-02-15 ENCOUNTER — WEB ENCOUNTER (OUTPATIENT)
Dept: URBAN - METROPOLITAN AREA CLINIC 96 | Facility: CLINIC | Age: 41
End: 2021-02-15

## 2021-02-19 ENCOUNTER — LAB OUTSIDE AN ENCOUNTER (OUTPATIENT)
Dept: URBAN - METROPOLITAN AREA TELEHEALTH 2 | Facility: TELEHEALTH | Age: 41
End: 2021-02-19

## 2021-02-19 ENCOUNTER — TELEPHONE ENCOUNTER (OUTPATIENT)
Dept: URBAN - METROPOLITAN AREA CLINIC 92 | Facility: CLINIC | Age: 41
End: 2021-02-19

## 2021-02-19 ENCOUNTER — OFFICE VISIT (OUTPATIENT)
Dept: URBAN - METROPOLITAN AREA TELEHEALTH 2 | Facility: TELEHEALTH | Age: 41
End: 2021-02-19
Payer: MEDICARE

## 2021-02-19 ENCOUNTER — TELEPHONE ENCOUNTER (OUTPATIENT)
Dept: URBAN - METROPOLITAN AREA CLINIC 96 | Facility: CLINIC | Age: 41
End: 2021-02-19

## 2021-02-19 DIAGNOSIS — K50.80 CROHN'S DISEASE OF BOTH SMALL AND LARGE INTESTINE WITHOUT COMPLICATION: ICD-10-CM

## 2021-02-19 DIAGNOSIS — R10.84 ABDOMINAL CRAMPING, GENERALIZED: ICD-10-CM

## 2021-02-19 DIAGNOSIS — E86.0 DEHYDRATION: ICD-10-CM

## 2021-02-19 DIAGNOSIS — R13.10 DYSPHAGIA: ICD-10-CM

## 2021-02-19 PROCEDURE — 1036F TOBACCO NON-USER: CPT | Performed by: INTERNAL MEDICINE

## 2021-02-19 PROCEDURE — G9903 PT SCRN TBCO ID AS NON USER: HCPCS | Performed by: INTERNAL MEDICINE

## 2021-02-19 PROCEDURE — G8420 CALC BMI NORM PARAMETERS: HCPCS | Performed by: INTERNAL MEDICINE

## 2021-02-19 PROCEDURE — 3017F COLORECTAL CA SCREEN DOC REV: CPT | Performed by: INTERNAL MEDICINE

## 2021-02-19 PROCEDURE — G8427 DOCREV CUR MEDS BY ELIG CLIN: HCPCS | Performed by: INTERNAL MEDICINE

## 2021-02-19 PROCEDURE — 99215 OFFICE O/P EST HI 40 MIN: CPT | Performed by: INTERNAL MEDICINE

## 2021-02-19 PROCEDURE — G9622 NO UNHEAL ETOH USER: HCPCS | Performed by: INTERNAL MEDICINE

## 2021-02-19 PROCEDURE — G8482 FLU IMMUNIZE ORDER/ADMIN: HCPCS | Performed by: INTERNAL MEDICINE

## 2021-02-19 RX ORDER — NORTRIPTYLINE HYDROCHLORIDE 25 MG/1
TAKE 2 CAPSULES BY ORAL ROUTE ONCE A DAY (AT BEDTIME) FOR 90 DAYS CAPSULE ORAL 1
Qty: 180 | Refills: 4 | Status: ACTIVE | COMMUNITY
Start: 2020-01-22 | End: 2021-04-16

## 2021-02-19 RX ORDER — OMEPRAZOLE 40 MG/1
1 CAPSULE 30 MINUTES BEFORE MORNING MEAL BID ORALLY 90 CAPSULE, DELAYED RELEASE ORAL
Qty: 60 CAPSULE | Refills: 0 | Status: ACTIVE | COMMUNITY

## 2021-02-19 RX ORDER — CYANOCOBALAMIN 1000 UG/ML
INJECT 1ML ONCE WEEK FOR 30 DAYS INJECTION INTRAMUSCULAR; SUBCUTANEOUS
Qty: 4 | Refills: 4 | Status: ACTIVE | COMMUNITY
Start: 2018-05-30 | End: 1900-01-01

## 2021-02-19 RX ORDER — NORTRIPTYLINE HYDROCHLORIDE 25 MG/1
1 CAPSULE CAPSULE ORAL QPM
Qty: 30 | Refills: 4 | OUTPATIENT

## 2021-02-19 RX ORDER — PANTOPRAZOLE SODIUM 40 MG/1
1 TABLET TABLET, DELAYED RELEASE ORAL BID
Qty: 60 TABLET | Refills: 11 | OUTPATIENT
Start: 2021-02-19

## 2021-02-19 RX ORDER — DIPHENOXYLATE HCL/ATROPINE 2.5-.025MG
TAKE 2 TABLETS BY ORAL ROUTE 3 TIMES A DAY FOR 90 DAYS TABLET ORAL
Qty: 540 | Refills: 3 | Status: ACTIVE | COMMUNITY
Start: 2020-04-29 | End: 2021-04-24

## 2021-02-19 RX ORDER — OMEPRAZOLE 40 MG/1
1 CAPSULE CAPSULE, DELAYED RELEASE ORAL BID
Qty: 180 | Refills: 4 | Status: ACTIVE | COMMUNITY
Start: 2020-09-17

## 2021-02-19 RX ORDER — ADALIMUMAB 40MG/0.4ML
INJECT 0.4 ML UNDER THE SKIN ONCE A WEEK (WEEKLY DOSING) KIT SUBCUTANEOUS
Qty: 2 | Refills: 11 | Status: ACTIVE | COMMUNITY

## 2021-02-19 RX ORDER — DIPHENOXYLATE HYDROCHLORIDE AND ATROPINE SULFATE 2.5; .025 MG/1; MG/1
1 TABLET AS NEEDED TABLET ORAL
Qty: 120 | Refills: 3 | OUTPATIENT

## 2021-02-19 RX ORDER — MORPHINE 10 MG/ML
5 DROPS TINCTURE ORAL
Qty: 600 ML | Refills: 0 | Status: ACTIVE | COMMUNITY

## 2021-02-19 RX ORDER — DIPHENOXYLATE HYDROCHLORIDE AND ATROPINE SULFATE 2.5; .025 MG/1; MG/1
1 TABLET AS NEEDED TABLET ORAL
Qty: 120 | Refills: 3 | Status: ACTIVE | COMMUNITY

## 2021-02-19 RX ORDER — NORTRIPTYLINE HYDROCHLORIDE 25 MG/1
1 CAPSULE CAPSULE ORAL QPM
Qty: 30 | Refills: 4 | Status: ACTIVE | COMMUNITY

## 2021-02-19 RX ORDER — MORPHINE 10 MG/ML
5 DROPS TINCTURE ORAL
Qty: 600 ML | Refills: 0 | OUTPATIENT

## 2021-02-19 RX ORDER — OMEPRAZOLE 40 MG/1
1 CAPSULE ORALLY BID CAPSULE, DELAYED RELEASE ORAL
Qty: 180 | Refills: 4 | Status: ACTIVE | COMMUNITY

## 2021-02-19 RX ORDER — DICYCLOMINE HYDROCHLORIDE 20 MG/1
2 CAPSULES TABLET ORAL
Qty: 720 CAPSULE | Refills: 4 | Status: ACTIVE | COMMUNITY
Start: 2020-09-17 | End: 2021-12-10

## 2021-02-19 NOTE — PHYSICAL EXAM GASTROINTESTINAL
Abdomen , soft, nontender, nondistended , no guarding or rigidity , no masses palpable , normal bowel sounds , Liver and Spleen , no hepatomegaly present , no hepatosplenomegaly , liver nontender , spleen not palpable ADS NP

## 2021-02-19 NOTE — HPI-OTHER HISTORIES
. Ms Cota is a 39 y/o individual, with ileal CD, currently on Humira (every week currently since ), here for follow-up of her condition, here f/u. . She is referred by Dr. Brittanie Flannery. . She was diagnosed in  with sxs of vomiting.  She had first surgery at age 5 for bowel obstruction, intestinal obstruction was found and had resection. She had the next surgery in  at [place], after presenting with obstructions.  She had ileal resection performed at this time and was diagnosed with Crohn s disease.  At that time she was started on Remicade.  She used this for about 1 year, but had minimal improvement.  She was then started on Humira since .  This has worked for her. . Previously on 10/22/2018, she reports that she continues to get frequent hospitalizations with sxs of diarrhea, nausea, vomiting, and abdominal pain.  When she has a flare, she often loses weight.  She also has shortgut syndrome.  When she has a flare, she often takes prednisone.  Her last hospitalization was in 2018.  She reports that she has 2-3 BM s per day, but this can vary depending on what she eats.  She has diffuse abdominal pain, worse if she eats, nothing makes it better or worse other than time.  Has injection site pain with Humira.  Tincture of opium is way she maintains diarrheal sxs, without it, she has continuous diarrhea. . She had a colonoscopy 10/31/2018, which was normal. . Previously on 2018, she reports that she has had some post-procedure pain and some rectal bleeding.  She has significant amount of pain on thighs after injection of Humira. . Previously on 2019, she reports that she has daily nausea, but no flares.  Zofran and Phenergan helps her the most.  We attempted to switch to every other week Humira, however, she has developed joint swelling and pain. . Previously on 3/27/2019, she has severe diarrhea.  She developed shingles and we stopped her Humira for the past 2 weeks.  She has severe nerve pain on her right chest wall, started on gabapentin by doctor.  She has up to 15 BM with incontinence.  Minimal improvement with Lomotil and opium tincture has run out due to increased dosage requirement.  Significant stress, son had seizure and other son lacerated liver after falling off bike. . Previously on 2019, she reports that she was hospitalized for nausea, vomiting and diarrhea.  Her big issues is daily nausea, however, THC (helps with diarrhea) and CBD oil helps with nausea. . Previously on 2020, pt report that she has been noticing that her BP has been elevated at home checks.  She continues to have nausea and abdominal pain.  She has nausea on a daily basis and has lost weight because of this.  CBD oils is helping with nausea, however, it impairs her mental status and prevents her from being able to drive her car.  No diarrhea.  No joint swelling, doing well on Humira. . Previously on 2020, pt reports she stopped taking her THC (when kids stopped school).  However, she got severely dehydrated due to this.  She has lost 7 lbs (was having lot of nausea and diarrhea).  Was tested for c diff.  Was started on prednisone (by ER) and was able to taper off of this.  CBD oil helps with nausea. . Previously on 2020, pt reports she had a severe flare, went to ER for severe and out of control pain, 10 on scale of 10.  Having severe radiation of sxs, nothing makes it better or worse.  Had associated nausea as well.  She was given 3 doses of IV dilaudid, steroids.  No heartburn.  Uncontrolled diarrhea.  She was given flagyl in the ER, but did not get a stool culture performed.  The flagyl has helped somewhat. . Previously on 2020, pt reports that after procedure she had severe onset of abdominal pain, nausea and vomiting.  She went to ER, they did CT scan, which was negative.  They could not get IV access on her, due to the dehydration.   . Previously on 10/28/2020, pt reports she has continued to lose weight since the past month.  She has good days and bad days.  Has not been taking the CBD oil lately due to difficulty in getting it (due to COVID).   . Today on 2021 pt reports that she has been frustrated with her sxs.  She is now having GERD sxs. She modified her diet her and took epi-pen, did not help.  She is also noticing some solid dysphagia.  After taking the Creon, her diarrhea worsened significantly, she got very dehydrated and had to take IVF.  Her diarrhea is still persistent, improves somewhat with THC.   . Sees pain management for pain. . Labs: 2020: hgb 11.8, Wbc 4.3, ast 13, alt 5, iron sat 49, quant-gold (re-draw), b12 490, ferritin 109,  2019: Hgb 12.4, Ast 12, Alt 11, Cr 0.97 10/26/2018: Hgb 13.5, Cr 1.03, Ast 14, Alt 9, Iron sat 46, Ferritin 124; B12 >2000; vit D 12.2 Crp 0.2; esr 2 Humira 32  Quant-gold neg; hep B immune . FH: Aunt () with CD, brother with UC SH: No etoh/cig; disabled lives in [facility] PHM: LESTER, 2 small bowel resections, endometriosis, and scar tissue  . 2020: A Gastric, biopsy Mild chronic gastritis. No Helicobacter pylori microorganisms identified with a special stain. . B Terminal ileum, biopsy Small bowel mucosa with no significant histopathology. No histologic evidence of active or chronic ileitis.  C Random colon, biopsy Colonic mucosa with no significant histopathology. No histologic evidence of active, chronic or microscopic colitis . 10/31/2018: (Dr. Seals) - Normal mucosa in the recto-sigmoid colon, in the sigmoid colon, in the descending colon, at the splenic flexure, in the transverse colon, at the hepatic flexure and in the ascending colon.  Biopsied. - The examined portion of the ileum was normal.  Biopsied. - The ileocecal valve is normal.  Biopsied. . : Colonoscopy was normal with normal biopsies from ileum and colon .

## 2021-03-11 ENCOUNTER — WEB ENCOUNTER (OUTPATIENT)
Dept: URBAN - METROPOLITAN AREA CLINIC 96 | Facility: CLINIC | Age: 41
End: 2021-03-11

## 2021-03-11 RX ORDER — MORPHINE 10 MG/ML
5 DROPS TINCTURE ORAL
Qty: 600 ML | Refills: 0 | Status: ACTIVE | COMMUNITY

## 2021-03-11 RX ORDER — ADALIMUMAB 40MG/0.4ML
INJECT 0.4 ML UNDER THE SKIN ONCE A WEEK (WEEKLY DOSING) KIT SUBCUTANEOUS
Qty: 2 | Refills: 11 | Status: ACTIVE | COMMUNITY

## 2021-03-11 RX ORDER — CYANOCOBALAMIN 1000 UG/ML
INJECT 1ML ONCE WEEK FOR 30 DAYS INJECTION INTRAMUSCULAR; SUBCUTANEOUS
Qty: 4 | Refills: 4 | Status: ACTIVE | COMMUNITY
Start: 2018-05-30 | End: 1900-01-01

## 2021-03-11 RX ORDER — PANTOPRAZOLE SODIUM 40 MG/1
1 TABLET TABLET, DELAYED RELEASE ORAL BID
Qty: 60 TABLET | Refills: 11 | Status: ACTIVE | COMMUNITY
Start: 2021-02-19

## 2021-03-11 RX ORDER — DICYCLOMINE HYDROCHLORIDE 20 MG/1
2 CAPSULES TABLET ORAL
Qty: 720 CAPSULE | Refills: 4 | Status: ACTIVE | COMMUNITY
Start: 2020-09-17 | End: 2021-12-10

## 2021-03-11 RX ORDER — NORTRIPTYLINE HYDROCHLORIDE 25 MG/1
1 CAPSULE CAPSULE ORAL QPM
Qty: 30 | Refills: 4 | Status: ACTIVE | COMMUNITY

## 2021-03-11 RX ORDER — DIPHENOXYLATE HYDROCHLORIDE AND ATROPINE SULFATE 2.5; .025 MG/1; MG/1
1 TABLET AS NEEDED TABLET ORAL
Qty: 120 | Refills: 3 | Status: ACTIVE | COMMUNITY

## 2021-03-11 RX ORDER — PREDNISONE 10 MG/1
1 TAB TABLET ORAL ONCE A DAY
Qty: 30 TABLET | Refills: 0 | OUTPATIENT
Start: 2021-03-12 | End: 2021-04-11

## 2021-03-11 RX ORDER — OMEPRAZOLE 40 MG/1
1 CAPSULE CAPSULE, DELAYED RELEASE ORAL BID
Qty: 180 | Refills: 4 | Status: ACTIVE | COMMUNITY
Start: 2020-09-17

## 2021-03-11 RX ORDER — OMEPRAZOLE 40 MG/1
1 CAPSULE ORALLY BID CAPSULE, DELAYED RELEASE ORAL
Qty: 180 | Refills: 4 | Status: ACTIVE | COMMUNITY

## 2021-03-11 RX ORDER — NORTRIPTYLINE HYDROCHLORIDE 25 MG/1
TAKE 2 CAPSULES BY ORAL ROUTE ONCE A DAY (AT BEDTIME) FOR 90 DAYS CAPSULE ORAL 1
Qty: 180 | Refills: 4 | Status: ACTIVE | COMMUNITY
Start: 2020-01-22 | End: 2021-04-16

## 2021-03-11 RX ORDER — OMEPRAZOLE 40 MG/1
1 CAPSULE 30 MINUTES BEFORE MORNING MEAL BID ORALLY 90 CAPSULE, DELAYED RELEASE ORAL
Qty: 60 CAPSULE | Refills: 0 | Status: ACTIVE | COMMUNITY

## 2021-03-11 RX ORDER — DIPHENOXYLATE HCL/ATROPINE 2.5-.025MG
TAKE 2 TABLETS BY ORAL ROUTE 3 TIMES A DAY FOR 90 DAYS TABLET ORAL
Qty: 540 | Refills: 3 | Status: ACTIVE | COMMUNITY
Start: 2020-04-29 | End: 2021-04-24

## 2021-05-11 ENCOUNTER — OFFICE VISIT (OUTPATIENT)
Dept: URBAN - METROPOLITAN AREA TELEHEALTH 2 | Facility: TELEHEALTH | Age: 41
End: 2021-05-11
Payer: MEDICARE

## 2021-05-11 DIAGNOSIS — R13.19 OTHER DYSPHAGIA: ICD-10-CM

## 2021-05-11 DIAGNOSIS — K50.80 CROHN'S DISEASE OF BOTH SMALL AND LARGE INTESTINE WITHOUT COMPLICATION: ICD-10-CM

## 2021-05-11 DIAGNOSIS — R11.2 NAUSEA & VOMITING: ICD-10-CM

## 2021-05-11 DIAGNOSIS — E86.0 DEHYDRATION: ICD-10-CM

## 2021-05-11 DIAGNOSIS — Z79.899 LONG-TERM USE OF IMMUNOSUPPRESSANT MEDICATION: ICD-10-CM

## 2021-05-11 DIAGNOSIS — R10.84 ABDOMINAL PAIN, GENERALIZED: ICD-10-CM

## 2021-05-11 PROCEDURE — 99215 OFFICE O/P EST HI 40 MIN: CPT | Performed by: INTERNAL MEDICINE

## 2021-05-11 RX ORDER — OMEPRAZOLE 40 MG/1
1 CAPSULE 30 MINUTES BEFORE MORNING MEAL BID ORALLY 90 CAPSULE, DELAYED RELEASE ORAL
Qty: 60 CAPSULE | Refills: 0 | COMMUNITY

## 2021-05-11 RX ORDER — OMEPRAZOLE 40 MG/1
1 CAPSULE ORALLY BID CAPSULE, DELAYED RELEASE ORAL
Qty: 180 | Refills: 4 | COMMUNITY

## 2021-05-11 RX ORDER — OMEPRAZOLE 40 MG/1
1 CAPSULE CAPSULE, DELAYED RELEASE ORAL BID
Qty: 180 | Refills: 4 | COMMUNITY
Start: 2020-09-17

## 2021-05-11 RX ORDER — DIPHENOXYLATE HYDROCHLORIDE AND ATROPINE SULFATE 2.5; .025 MG/1; MG/1
1 TABLET AS NEEDED TABLET ORAL
Qty: 120 | Refills: 3 | COMMUNITY

## 2021-05-11 RX ORDER — PANTOPRAZOLE SODIUM 40 MG/1
1 TABLET TABLET, DELAYED RELEASE ORAL BID
Qty: 60 TABLET | Refills: 11 | COMMUNITY
Start: 2021-02-19

## 2021-05-11 RX ORDER — PANTOPRAZOLE SODIUM 40 MG/1
1 TABLET TABLET, DELAYED RELEASE ORAL BID
Qty: 60 TABLET | Refills: 11 | OUTPATIENT

## 2021-05-11 RX ORDER — DICYCLOMINE HYDROCHLORIDE 20 MG/1
2 CAPSULES TABLET ORAL
Qty: 720 CAPSULE | Refills: 4 | COMMUNITY
Start: 2020-09-17 | End: 2021-12-10

## 2021-05-11 RX ORDER — HYOSCYAMINE SULFATE 0.12 MG/1
1 TABLET AS NEEDED TABLET ORAL
Qty: 120 | Refills: 3 | OUTPATIENT
Start: 2021-05-11 | End: 2021-09-08

## 2021-05-11 RX ORDER — DIPHENOXYLATE HYDROCHLORIDE AND ATROPINE SULFATE 2.5; .025 MG/1; MG/1
1 TABLET AS NEEDED TABLET ORAL
Qty: 120 | Refills: 3 | OUTPATIENT

## 2021-05-11 RX ORDER — CYANOCOBALAMIN 1000 UG/ML
INJECT 1ML ONCE WEEK FOR 30 DAYS INJECTION INTRAMUSCULAR; SUBCUTANEOUS
Qty: 4 | Refills: 4 | COMMUNITY
Start: 2018-05-30

## 2021-05-11 RX ORDER — NORTRIPTYLINE HYDROCHLORIDE 25 MG/1
1 CAPSULE CAPSULE ORAL QPM
Qty: 30 | Refills: 4 | COMMUNITY

## 2021-05-11 RX ORDER — ADALIMUMAB 40MG/0.4ML
INJECT 0.4 ML UNDER THE SKIN ONCE A WEEK (WEEKLY DOSING) KIT SUBCUTANEOUS
Qty: 2 | Refills: 11 | COMMUNITY

## 2021-05-11 RX ORDER — MORPHINE 10 MG/ML
5 DROPS TINCTURE ORAL
Qty: 600 ML | Refills: 0 | OUTPATIENT

## 2021-05-11 RX ORDER — MORPHINE 10 MG/ML
5 DROPS TINCTURE ORAL
Qty: 600 ML | Refills: 0 | COMMUNITY

## 2021-05-11 NOTE — HPI-TODAY'S VISIT:
. Ms Cota is a 41 y/o individual, with ileal CD, currently on Humira (every week currently since ), here for follow-up of her condition, here f/u. . She is referred by Dr. Brittanie Flannery. . She was diagnosed in  with sxs of vomiting.  She had first surgery at age 5 for bowel obstruction, intestinal obstruction was found and had resection. She had the next surgery in  at [place], after presenting with obstructions.  She had ileal resection performed at this time and was diagnosed with Crohn s disease.  At that time she was started on Remicade.  She used this for about 1 year, but had minimal improvement.  She was then started on Humira since .  This has worked for her. . Previously on 10/22/2018, she reports that she continues to get frequent hospitalizations with sxs of diarrhea, nausea, vomiting, and abdominal pain.  When she has a flare, she often loses weight.  She also has shortgut syndrome.  When she has a flare, she often takes prednisone.  Her last hospitalization was in 2018.  She reports that she has 2-3 BM s per day, but this can vary depending on what she eats.  She has diffuse abdominal pain, worse if she eats, nothing makes it better or worse other than time.  Has injection site pain with Humira.  Tincture of opium is way she maintains diarrheal sxs, without it, she has continuous diarrhea. . She had a colonoscopy 10/31/2018, which was normal. . Previously on 2018, she reports that she has had some post-procedure pain and some rectal bleeding.  She has significant amount of pain on thighs after injection of Humira. . Previously on 2019, she reports that she has daily nausea, but no flares.  Zofran and Phenergan helps her the most.  We attempted to switch to every other week Humira, however, she has developed joint swelling and pain. . Previously on 3/27/2019, she has severe diarrhea.  She developed shingles and we stopped her Humira for the past 2 weeks.  She has severe nerve pain on her right chest wall, started on gabapentin by doctor.  She has up to 15 BM with incontinence.  Minimal improvement with Lomotil and opium tincture has run out due to increased dosage requirement.  Significant stress, son had seizure and other son lacerated liver after falling off bike. . Previously on 2019, she reports that she was hospitalized for nausea, vomiting and diarrhea.  Her big issues is daily nausea, however, THC (helps with diarrhea) and CBD oil helps with nausea. . Previously on 2020, pt report that she has been noticing that her BP has been elevated at home checks.  She continues to have nausea and abdominal pain.  She has nausea on a daily basis and has lost weight because of this.  CBD oils is helping with nausea, however, it impairs her mental status and prevents her from being able to drive her car.  No diarrhea.  No joint swelling, doing well on Humira. . Previously on 2020, pt reports she stopped taking her THC (when kids stopped school).  However, she got severely dehydrated due to this.  She has lost 7 lbs (was having lot of nausea and diarrhea).  Was tested for c diff.  Was started on prednisone (by ER) and was able to taper off of this.  CBD oil helps with nausea. . Previously on 2020, pt reports she had a severe flare, went to ER for severe and out of control pain, 10 on scale of 10.  Having severe radiation of sxs, nothing makes it better or worse.  Had associated nausea as well.  She was given 3 doses of IV dilaudid, steroids.  No heartburn.  Uncontrolled diarrhea.  She was given flagyl in the ER, but did not get a stool culture performed.  The flagyl has helped somewhat. . Previously on 2020, pt reports that after procedure she had severe onset of abdominal pain, nausea and vomiting.  She went to ER, they did CT scan, which was negative.  They could not get IV access on her, due to the dehydration.   Previously on 10/28/2020, pt reports she has continued to lose weight since the past month.  She has good days and bad days.  Has not been taking the CBD oil lately due to difficulty in getting it (due to COVID).   Previously on 2021 pt reports that she has been frustrated with her sxs.  She is now having GERD sxs. She modified her diet her and took epi-pen, did not help.  She is also noticing some solid dysphagia.   . Today on 2021, pt reports that she had to go to [facility] with severe n/v/diarreha and abd pain, diffuse in nature, nothing makes it better or worse.  3 weeks ago her dad  of heart attack.  Perhaps she attributes the stress of this event. THC does help her nausea sxs, only inhaled. . Sees pain management for pain. . Labs: 2020: hgb 11.8, Wbc 4.3, ast 13, alt 5, iron sat 49, quant-gold (re-draw), b12 490, ferritin 109,  2019: Hgb 12.4, Ast 12, Alt 11, Cr 0.97 10/26/2018: Hgb 13.5, Cr 1.03, Ast 14, Alt 9, Iron sat 46, Ferritin 124; B12 >2000; vit D 12.2 Crp 0.2; esr 2 Humira 32  Quant-gold neg; hep B immune . FH: Aunt () with CD, brother with UC SH: No etoh/cig; disabled lives in [facility] PHM: LESTER, 2 small bowel resections, endometriosis, and scar tissue  . 2020: A Gastric, biopsy Mild chronic gastritis. No Helicobacter pylori microorganisms identified with a special stain. . B Terminal ileum, biopsy Small bowel mucosa with no significant histopathology. No histologic evidence of active or chronic ileitis.  C Random colon, biopsy Colonic mucosa with no significant histopathology. No histologic evidence of active, chronic or microscopic colitis . 10/31/2018: (Dr. Seals) - Normal mucosa in the recto-sigmoid colon, in the sigmoid colon, in the descending colon, at the splenic flexure, in the transverse colon, at the hepatic flexure and in the ascending colon.  Biopsied. - The examined portion of the ileum was normal.  Biopsied. - The ileocecal valve is normal.  Biopsied. . : Colonoscopy was normal with normal biopsies from ileum and colon .

## 2021-05-14 ENCOUNTER — OFFICE VISIT (OUTPATIENT)
Dept: URBAN - METROPOLITAN AREA TELEHEALTH 2 | Facility: TELEHEALTH | Age: 41
End: 2021-05-14

## 2021-05-14 RX ORDER — OMEPRAZOLE 40 MG/1
1 CAPSULE ORALLY BID CAPSULE, DELAYED RELEASE ORAL
Qty: 180 | Refills: 4 | COMMUNITY

## 2021-05-14 RX ORDER — ADALIMUMAB 40MG/0.4ML
INJECT 0.4 ML UNDER THE SKIN ONCE A WEEK (WEEKLY DOSING) KIT SUBCUTANEOUS
Qty: 2 | Refills: 11 | COMMUNITY

## 2021-05-14 RX ORDER — CYANOCOBALAMIN 1000 UG/ML
INJECT 1ML ONCE WEEK FOR 30 DAYS INJECTION INTRAMUSCULAR; SUBCUTANEOUS
Qty: 4 | Refills: 4 | COMMUNITY
Start: 2018-05-30

## 2021-05-14 RX ORDER — DICYCLOMINE HYDROCHLORIDE 20 MG/1
2 CAPSULES TABLET ORAL
Qty: 720 CAPSULE | Refills: 4 | COMMUNITY
Start: 2020-09-17 | End: 2021-12-10

## 2021-05-14 RX ORDER — OMEPRAZOLE 40 MG/1
1 CAPSULE 30 MINUTES BEFORE MORNING MEAL BID ORALLY 90 CAPSULE, DELAYED RELEASE ORAL
Qty: 60 CAPSULE | Refills: 0 | COMMUNITY

## 2021-05-14 RX ORDER — PANTOPRAZOLE SODIUM 40 MG/1
1 TABLET TABLET, DELAYED RELEASE ORAL BID
Qty: 60 TABLET | Refills: 11 | COMMUNITY
Start: 2021-02-19

## 2021-05-14 RX ORDER — MORPHINE 10 MG/ML
5 DROPS TINCTURE ORAL
Qty: 600 ML | Refills: 0 | COMMUNITY

## 2021-05-14 RX ORDER — DIPHENOXYLATE HYDROCHLORIDE AND ATROPINE SULFATE 2.5; .025 MG/1; MG/1
1 TABLET AS NEEDED TABLET ORAL
Qty: 120 | Refills: 3 | COMMUNITY

## 2021-05-14 RX ORDER — NORTRIPTYLINE HYDROCHLORIDE 25 MG/1
1 CAPSULE CAPSULE ORAL QPM
Qty: 30 | Refills: 4 | COMMUNITY

## 2021-05-14 RX ORDER — OMEPRAZOLE 40 MG/1
1 CAPSULE CAPSULE, DELAYED RELEASE ORAL BID
Qty: 180 | Refills: 4 | COMMUNITY
Start: 2020-09-17

## 2021-05-24 ENCOUNTER — WEB ENCOUNTER (OUTPATIENT)
Dept: URBAN - METROPOLITAN AREA CLINIC 96 | Facility: CLINIC | Age: 41
End: 2021-05-24

## 2021-05-26 ENCOUNTER — OFFICE VISIT (OUTPATIENT)
Dept: URBAN - METROPOLITAN AREA CLINIC 98 | Facility: CLINIC | Age: 41
End: 2021-05-26

## 2021-06-04 ENCOUNTER — TELEPHONE ENCOUNTER (OUTPATIENT)
Dept: URBAN - METROPOLITAN AREA CLINIC 98 | Facility: CLINIC | Age: 41
End: 2021-06-04

## 2021-06-04 RX ORDER — PREDNISONE 10 MG/1
2 TABLETS TABLET ORAL ONCE A DAY
Qty: 60 | Refills: 0
Start: 2020-09-21 | End: 2021-07-04

## 2021-06-08 ENCOUNTER — TELEPHONE ENCOUNTER (OUTPATIENT)
Dept: URBAN - METROPOLITAN AREA CLINIC 23 | Facility: CLINIC | Age: 41
End: 2021-06-08

## 2021-06-10 ENCOUNTER — WEB ENCOUNTER (OUTPATIENT)
Dept: URBAN - METROPOLITAN AREA CLINIC 96 | Facility: CLINIC | Age: 41
End: 2021-06-10

## 2021-06-14 ENCOUNTER — TELEPHONE ENCOUNTER (OUTPATIENT)
Dept: URBAN - METROPOLITAN AREA CLINIC 92 | Facility: CLINIC | Age: 41
End: 2021-06-14

## 2021-08-27 ENCOUNTER — TELEPHONE ENCOUNTER (OUTPATIENT)
Dept: URBAN - METROPOLITAN AREA CLINIC 92 | Facility: CLINIC | Age: 41
End: 2021-08-27

## 2021-08-27 RX ORDER — PROMETHAZINE HYDROCHLORIDE 12.5 MG/1
1 SUPPOSITORY AS NEEDED SUPPOSITORY RECTAL
Qty: 60 | Refills: 1 | OUTPATIENT
Start: 2021-08-30 | End: 2021-10-29

## 2021-09-09 ENCOUNTER — TELEPHONE ENCOUNTER (OUTPATIENT)
Dept: URBAN - METROPOLITAN AREA CLINIC 92 | Facility: CLINIC | Age: 41
End: 2021-09-09

## 2021-10-18 ENCOUNTER — TELEPHONE ENCOUNTER (OUTPATIENT)
Dept: URBAN - METROPOLITAN AREA CLINIC 98 | Facility: CLINIC | Age: 41
End: 2021-10-18

## 2021-10-20 ENCOUNTER — TELEPHONE ENCOUNTER (OUTPATIENT)
Dept: URBAN - METROPOLITAN AREA CLINIC 92 | Facility: CLINIC | Age: 41
End: 2021-10-20

## 2021-12-28 ENCOUNTER — TELEPHONE ENCOUNTER (OUTPATIENT)
Dept: URBAN - METROPOLITAN AREA CLINIC 92 | Facility: CLINIC | Age: 41
End: 2021-12-28

## 2022-02-21 ENCOUNTER — TELEPHONE ENCOUNTER (OUTPATIENT)
Dept: URBAN - METROPOLITAN AREA CLINIC 96 | Facility: CLINIC | Age: 42
End: 2022-02-21

## 2022-03-01 NOTE — PHYSICAL EXAM CONSTITUTIONAL:
well developed, well nourished , in no acute distress , ambulating without difficulty , normal communication ability Body Location Override (Optional - Billing Will Still Be Based On Selected Body Map Location If Applicable): vertex Add 44756 Cpt? (Important Note: In 2017 The Use Of 13106 Is Being Tracked By Cms To Determine Future Global Period Reimbursement For Global Periods): no Detail Level: Detailed

## 2022-03-14 ENCOUNTER — OFFICE VISIT (OUTPATIENT)
Dept: URBAN - METROPOLITAN AREA TELEHEALTH 2 | Facility: TELEHEALTH | Age: 42
End: 2022-03-14
Payer: MEDICARE

## 2022-03-14 DIAGNOSIS — R13.10 DYSPHAGIA: ICD-10-CM

## 2022-03-14 DIAGNOSIS — Z09 FOLLOW UP: ICD-10-CM

## 2022-03-14 DIAGNOSIS — E86.0 DEHYDRATION: ICD-10-CM

## 2022-03-14 DIAGNOSIS — R10.84 ABDOMINAL CRAMPING, GENERALIZED: ICD-10-CM

## 2022-03-14 DIAGNOSIS — Z79.899 LONG-TERM USE OF IMMUNOSUPPRESSANT MEDICATION: ICD-10-CM

## 2022-03-14 DIAGNOSIS — R11.2 NAUSEA & VOMITING: ICD-10-CM

## 2022-03-14 DIAGNOSIS — K50.80 CROHN'S DISEASE OF BOTH SMALL AND LARGE INTESTINE WITHOUT COMPLICATION: ICD-10-CM

## 2022-03-14 DIAGNOSIS — R10.9 ABDOMINAL PAIN: ICD-10-CM

## 2022-03-14 PROCEDURE — 99215 OFFICE O/P EST HI 40 MIN: CPT | Performed by: INTERNAL MEDICINE

## 2022-03-14 RX ORDER — CYANOCOBALAMIN 1000 UG/ML
INJECT 1ML ONCE WEEK FOR 30 DAYS INJECTION INTRAMUSCULAR; SUBCUTANEOUS
Qty: 4 | Refills: 4 | COMMUNITY
Start: 2018-05-30

## 2022-03-14 RX ORDER — PANTOPRAZOLE SODIUM 40 MG/1
1 TABLET TABLET, DELAYED RELEASE ORAL BID
Qty: 60 TABLET | Refills: 11 | OUTPATIENT

## 2022-03-14 RX ORDER — ADALIMUMAB 40MG/0.4ML
INJECT 0.4 ML UNDER THE SKIN ONCE A WEEK (WEEKLY DOSING) KIT SUBCUTANEOUS
Qty: 2 | Refills: 11 | COMMUNITY

## 2022-03-14 RX ORDER — MORPHINE 10 MG/ML
5 DROPS TINCTURE ORAL
Qty: 600 ML | Refills: 0 | COMMUNITY

## 2022-03-14 RX ORDER — OMEPRAZOLE 40 MG/1
1 CAPSULE CAPSULE, DELAYED RELEASE ORAL BID
Qty: 180 | Refills: 4 | COMMUNITY
Start: 2020-09-17

## 2022-03-14 RX ORDER — DIPHENOXYLATE HYDROCHLORIDE AND ATROPINE SULFATE 2.5; .025 MG/1; MG/1
1 TABLET AS NEEDED TABLET ORAL
Qty: 120 | Refills: 3 | COMMUNITY

## 2022-03-14 RX ORDER — ADALIMUMAB 40MG/0.4ML
1 PEN KIT SUBCUTANEOUS
Qty: 2 | Refills: 11 | OUTPATIENT

## 2022-03-14 RX ORDER — DIPHENOXYLATE HYDROCHLORIDE AND ATROPINE SULFATE 2.5; .025 MG/1; MG/1
1 TABLET AS NEEDED TABLET ORAL
Qty: 120 | Refills: 3 | OUTPATIENT

## 2022-03-14 RX ORDER — FAMOTIDINE 40 MG/1
1 TAB TABLET, FILM COATED ORAL BID
Qty: 60 TABLET | Refills: 11 | OUTPATIENT
Start: 2022-03-14

## 2022-03-14 RX ORDER — PANTOPRAZOLE SODIUM 40 MG/1
1 TABLET TABLET, DELAYED RELEASE ORAL BID
Qty: 60 TABLET | Refills: 11 | COMMUNITY
Start: 2021-02-19

## 2022-03-14 RX ORDER — MORPHINE 10 MG/ML
5 DROPS TINCTURE ORAL
Qty: 300 ML | Refills: 0 | OUTPATIENT

## 2022-03-14 RX ORDER — OMEPRAZOLE 40 MG/1
1 CAPSULE 30 MINUTES BEFORE MORNING MEAL BID ORALLY 90 CAPSULE, DELAYED RELEASE ORAL
Qty: 60 CAPSULE | Refills: 0 | COMMUNITY

## 2022-03-14 RX ORDER — OMEPRAZOLE 40 MG/1
1 CAPSULE ORALLY BID CAPSULE, DELAYED RELEASE ORAL
Qty: 180 | Refills: 4 | COMMUNITY

## 2022-03-14 RX ORDER — NORTRIPTYLINE HYDROCHLORIDE 25 MG/1
1 CAPSULE CAPSULE ORAL QPM
Qty: 30 | Refills: 4 | COMMUNITY

## 2022-03-14 NOTE — HPI-TODAY'S VISIT:
. Ms Cota is a 40 y/o individual, with ileal CD, currently on Humira (every week currently since ), here for follow-up of her condition, here f/u. . She is referred by Dr. Brittanie Flannery. . She was diagnosed in  with sxs of vomiting.  She had first surgery at age 5 for bowel obstruction, intestinal obstruction was found and had resection. She had the next surgery in  at [place], after presenting with obstructions.  She had ileal resection performed at this time and was diagnosed with Crohn s disease.  At that time she was started on Remicade.  She used this for about 1 year, but had minimal improvement.  She was then started on Humira since .  This has worked for her. . Previously on 10/22/2018, she reports that she continues to get frequent hospitalizations with sxs of diarrhea, nausea, vomiting, and abdominal pain.  When she has a flare, she often loses weight.  She also has shortgut syndrome.  When she has a flare, she often takes prednisone.  Her last hospitalization was in 2018.  She reports that she has 2-3 BM s per day, but this can vary depending on what she eats.  She has diffuse abdominal pain, worse if she eats, nothing makes it better or worse other than time.  Has injection site pain with Humira.  Tincture of opium is way she maintains diarrheal sxs, without it, she has continuous diarrhea. . She had a colonoscopy 10/31/2018, which was normal. . Previously on 2018, she reports that she has had some post-procedure pain and some rectal bleeding.  She has significant amount of pain on thighs after injection of Humira. . Previously on 2019, she reports that she has daily nausea, but no flares.  Zofran and Phenergan helps her the most.  We attempted to switch to every other week Humira, however, she has developed joint swelling and pain. . Previously on 3/27/2019, she has severe diarrhea.  She developed shingles and we stopped her Humira for the past 2 weeks.  She has severe nerve pain on her right chest wall, started on gabapentin by doctor.  She has up to 15 BM with incontinence.  Minimal improvement with Lomotil and opium tincture has run out due to increased dosage requirement.  Significant stress, son had seizure and other son lacerated liver after falling off bike. . Previously on 2019, she reports that she was hospitalized for nausea, vomiting and diarrhea.  Her big issues is daily nausea, however, THC (helps with diarrhea) and CBD oil helps with nausea. . Previously on 2020, pt report that she has been noticing that her BP has been elevated at home checks.  She continues to have nausea and abdominal pain.  She has nausea on a daily basis and has lost weight because of this.  CBD oils is helping with nausea, however, it impairs her mental status and prevents her from being able to drive her car.  No diarrhea.  No joint swelling, doing well on Humira. . Previously on 2020, pt reports she stopped taking her THC (when kids stopped school).  However, she got severely dehydrated due to this.  She has lost 7 lbs (was having lot of nausea and diarrhea).  Was tested for c diff.  Was started on prednisone (by ER) and was able to taper off of this.  CBD oil helps with nausea. . Previously on 2020, pt reports she had a severe flare, went to ER for severe and out of control pain, 10 on scale of 10.  Having severe radiation of sxs, nothing makes it better or worse.  Had associated nausea as well.  She was given 3 doses of IV dilaudid, steroids.  No heartburn.  Uncontrolled diarrhea.  She was given flagyl in the ER, but did not get a stool culture performed.  The flagyl has helped somewhat. . Previously on 2020, pt reports that after procedure she had severe onset of abdominal pain, nausea and vomiting.  She went to ER, they did CT scan, which was negative.  They could not get IV access on her, due to the dehydration.   Previously on 10/28/2020, pt reports she has continued to lose weight since the past month.  She has good days and bad days.  Has not been taking the CBD oil lately due to difficulty in getting it (due to COVID).   Previously on 2021 pt reports that she has been frustrated with her sxs.  She is now having GERD sxs. She modified her diet her and took epi-pen, did not help.  She is also noticing some solid dysphagia.   . Previously on 2021, pt reports that she had to go to [facility] with severe n/v/diarreha and abd pain, diffuse in nature, nothing makes it better or worse.  3 weeks ago her dad  of heart attack.  Perhaps she attributes the stress of this event. THC does help her nausea sxs, only inhaled. . Today on 3/14/2022, pt reports that she had a flare in Aug and Sept in  (went to ER); had flare up in 2022 as well.  Still having some diarrhea and did lose some weight. . Sees pain management for pain. . Labs: 2020: hgb 11.8, Wbc 4.3, ast 13, alt 5, iron sat 49, quant-gold (re-draw), b12 490, ferritin 109,  2019: Hgb 12.4, Ast 12, Alt 11, Cr 0.97 10/26/2018: Hgb 13.5, Cr 1.03, Ast 14, Alt 9, Iron sat 46, Ferritin 124; B12 >2000; vit D 12.2 Crp 0.2; esr 2 Humira 32  Quant-gold neg; hep B immune . FH: Aunt () with CD, brother with UC SH: No etoh/cig; disabled lives in [facility] PHM: LESTER, 2 small bowel resections, endometriosis, and scar tissue  . 2020: A Gastric, biopsy Mild chronic gastritis. No Helicobacter pylori microorganisms identified with a special stain. . B Terminal ileum, biopsy Small bowel mucosa with no significant histopathology. No histologic evidence of active or chronic ileitis.  C Random colon, biopsy Colonic mucosa with no significant histopathology. No histologic evidence of active, chronic or microscopic colitis . 10/31/2018: (Dr. Seals) - Normal mucosa in the recto-sigmoid colon, in the sigmoid colon, in the descending colon, at the splenic flexure, in the transverse colon, at the hepatic flexure and in the ascending colon.  Biopsied. - The examined portion of the ileum was normal.  Biopsied. - The ileocecal valve is normal.  Biopsied. . : Colonoscopy was normal with normal biopsies from ileum and colon

## 2022-04-08 ENCOUNTER — TELEPHONE ENCOUNTER (OUTPATIENT)
Dept: URBAN - METROPOLITAN AREA CLINIC 92 | Facility: CLINIC | Age: 42
End: 2022-04-08

## 2022-04-30 ENCOUNTER — TELEPHONE ENCOUNTER (OUTPATIENT)
Dept: URBAN - METROPOLITAN AREA CLINIC 121 | Facility: CLINIC | Age: 42
End: 2022-04-30

## 2022-05-01 ENCOUNTER — TELEPHONE ENCOUNTER (OUTPATIENT)
Dept: URBAN - METROPOLITAN AREA CLINIC 121 | Facility: CLINIC | Age: 42
End: 2022-05-01

## 2022-05-01 RX ORDER — OXYCODONE HYDROCHLORIDE AND ACETAMINOPHEN 5; 325 MG/1; MG/1
TABLET ORAL
Status: ACTIVE | COMMUNITY
Start: 2012-11-27

## 2022-05-01 RX ORDER — PREDNISONE 20 MG/1
TABLET ORAL
Status: ACTIVE | COMMUNITY
Start: 2012-11-27

## 2022-05-01 RX ORDER — COLESEVELAM HYDROCHLORIDE 625 MG/1
TABLET, FILM COATED ORAL
Status: ACTIVE | COMMUNITY
Start: 2012-11-27

## 2022-05-01 RX ORDER — HYDROXYZINE HYDROCHLORIDE 50 MG/1
TABLET, FILM COATED ORAL
Status: ACTIVE | COMMUNITY
Start: 2012-11-27

## 2022-05-01 RX ORDER — ESCITALOPRAM 20 MG/1
TABLET, FILM COATED ORAL
Status: ACTIVE | COMMUNITY
Start: 2012-11-27

## 2022-05-01 RX ORDER — ESTRADIOL 1 MG/1
TABLET ORAL
Status: ACTIVE | COMMUNITY
Start: 2012-11-27

## 2022-05-01 RX ORDER — CYANOCOBALAMIN 1000 UG/ML
INJECTION INTRAMUSCULAR; SUBCUTANEOUS
Status: ACTIVE | COMMUNITY
Start: 2012-11-27

## 2022-05-01 RX ORDER — OMEPRAZOLE 40 MG/1
CAPSULE, DELAYED RELEASE ORAL
Status: ACTIVE | COMMUNITY
Start: 2012-11-27

## 2022-05-01 RX ORDER — CLONAZEPAM 0.5 MG/1
TABLET ORAL
Status: ACTIVE | COMMUNITY
Start: 2012-11-27

## 2022-05-01 RX ORDER — ONDANSETRON HYDROCHLORIDE 4 MG/1
TABLET, FILM COATED ORAL
Status: ACTIVE | COMMUNITY
Start: 2012-11-27

## 2022-05-26 ENCOUNTER — TELEPHONE ENCOUNTER (OUTPATIENT)
Dept: URBAN - METROPOLITAN AREA CLINIC 98 | Facility: CLINIC | Age: 42
End: 2022-05-26

## 2022-06-02 ENCOUNTER — TELEPHONE ENCOUNTER (OUTPATIENT)
Dept: URBAN - METROPOLITAN AREA CLINIC 96 | Facility: CLINIC | Age: 42
End: 2022-06-02

## 2022-06-08 ENCOUNTER — CLAIMS CREATED FROM THE CLAIM WINDOW (OUTPATIENT)
Dept: URBAN - METROPOLITAN AREA TELEHEALTH 2 | Facility: TELEHEALTH | Age: 42
End: 2022-06-08
Payer: MEDICARE

## 2022-06-08 DIAGNOSIS — R10.9 ABDOMINAL PAIN: ICD-10-CM

## 2022-06-08 DIAGNOSIS — R13.19 DYSPHAGIA: ICD-10-CM

## 2022-06-08 DIAGNOSIS — R13.10 DYSPHAGIA: ICD-10-CM

## 2022-06-08 DIAGNOSIS — Z09 FOLLOW UP: ICD-10-CM

## 2022-06-08 DIAGNOSIS — Z79.899 LONG-TERM USE OF IMMUNOSUPPRESSANT MEDICATION: ICD-10-CM

## 2022-06-08 DIAGNOSIS — R11.2 NAUSEA & VOMITING: ICD-10-CM

## 2022-06-08 DIAGNOSIS — R63.4 WEIGHT LOSS: ICD-10-CM

## 2022-06-08 DIAGNOSIS — K50.80 CROHN'S DISEASE OF BOTH SMALL AND LARGE INTESTINE WITHOUT COMPLICATION: ICD-10-CM

## 2022-06-08 DIAGNOSIS — E86.0 DEHYDRATION: ICD-10-CM

## 2022-06-08 DIAGNOSIS — R19.7 CHRONIC DIARRHEA: ICD-10-CM

## 2022-06-08 PROBLEM — 40739000 DYSPHAGIA: Status: ACTIVE | Noted: 2021-02-19

## 2022-06-08 PROCEDURE — 99215 OFFICE O/P EST HI 40 MIN: CPT | Performed by: INTERNAL MEDICINE

## 2022-06-08 RX ORDER — OMEPRAZOLE 40 MG/1
1 CAPSULE ORALLY BID CAPSULE, DELAYED RELEASE ORAL
Qty: 180 | Refills: 4 | COMMUNITY

## 2022-06-08 RX ORDER — PANTOPRAZOLE SODIUM 40 MG/1
1 TABLET TABLET, DELAYED RELEASE ORAL BID
Qty: 60 TABLET | Refills: 11 | OUTPATIENT

## 2022-06-08 RX ORDER — CLONAZEPAM 0.5 MG/1
TABLET ORAL
Status: ACTIVE | COMMUNITY
Start: 2012-11-27

## 2022-06-08 RX ORDER — COLESEVELAM HYDROCHLORIDE 625 MG/1
TABLET, FILM COATED ORAL
Status: ACTIVE | COMMUNITY
Start: 2012-11-27

## 2022-06-08 RX ORDER — OMEPRAZOLE 40 MG/1
1 CAPSULE CAPSULE, DELAYED RELEASE ORAL BID
Qty: 180 | Refills: 4 | COMMUNITY
Start: 2020-09-17

## 2022-06-08 RX ORDER — CYANOCOBALAMIN 1000 UG/ML
INJECTION INTRAMUSCULAR; SUBCUTANEOUS
Status: ACTIVE | COMMUNITY
Start: 2012-11-27

## 2022-06-08 RX ORDER — OXYCODONE HYDROCHLORIDE AND ACETAMINOPHEN 5; 325 MG/1; MG/1
TABLET ORAL
Status: ACTIVE | COMMUNITY
Start: 2012-11-27

## 2022-06-08 RX ORDER — OMEPRAZOLE 40 MG/1
CAPSULE, DELAYED RELEASE ORAL
Status: ACTIVE | COMMUNITY
Start: 2012-11-27

## 2022-06-08 RX ORDER — DIPHENOXYLATE HYDROCHLORIDE AND ATROPINE SULFATE 2.5; .025 MG/1; MG/1
1 TABLET AS NEEDED TABLET ORAL
Qty: 120 | Refills: 3 | OUTPATIENT

## 2022-06-08 RX ORDER — PANTOPRAZOLE SODIUM 40 MG/1
1 TABLET TABLET, DELAYED RELEASE ORAL BID
Qty: 60 TABLET | Refills: 11 | Status: ACTIVE | COMMUNITY

## 2022-06-08 RX ORDER — ADALIMUMAB 40MG/0.4ML
1 PEN KIT SUBCUTANEOUS
Qty: 2 | Refills: 11 | OUTPATIENT

## 2022-06-08 RX ORDER — ESTRADIOL 1 MG/1
TABLET ORAL
Status: ACTIVE | COMMUNITY
Start: 2012-11-27

## 2022-06-08 RX ORDER — ADALIMUMAB 40MG/0.4ML
1 PEN KIT SUBCUTANEOUS
Qty: 2 | Refills: 11 | Status: ACTIVE | COMMUNITY

## 2022-06-08 RX ORDER — ADALIMUMAB 40MG/0.4ML
1 PEN KIT SUBCUTANEOUS
Qty: 1 | Refills: 11 | OUTPATIENT
Start: 2022-06-08 | End: 2022-06-20

## 2022-06-08 RX ORDER — ADALIMUMAB 40MG/0.4ML
INJECT 0.4 ML UNDER THE SKIN ONCE A WEEK (WEEKLY DOSING) KIT SUBCUTANEOUS
Qty: 2 | Refills: 11 | COMMUNITY

## 2022-06-08 RX ORDER — PREDNISONE 20 MG/1
TABLET ORAL
Status: ACTIVE | COMMUNITY
Start: 2012-11-27

## 2022-06-08 RX ORDER — NORTRIPTYLINE HYDROCHLORIDE 25 MG/1
1 CAPSULE CAPSULE ORAL QPM
Qty: 30 | Refills: 4 | COMMUNITY

## 2022-06-08 RX ORDER — MORPHINE 10 MG/ML
5 DROPS TINCTURE ORAL
Qty: 300 ML | Refills: 0 | Status: ACTIVE | COMMUNITY

## 2022-06-08 RX ORDER — FAMOTIDINE 40 MG/1
1 TAB TABLET, FILM COATED ORAL BID
Qty: 60 TABLET | Refills: 11 | Status: ACTIVE | COMMUNITY
Start: 2022-03-14

## 2022-06-08 RX ORDER — ONDANSETRON HYDROCHLORIDE 4 MG/1
TABLET, FILM COATED ORAL
Status: ACTIVE | COMMUNITY
Start: 2012-11-27

## 2022-06-08 RX ORDER — DIPHENOXYLATE HYDROCHLORIDE AND ATROPINE SULFATE 2.5; .025 MG/1; MG/1
1 TABLET AS NEEDED TABLET ORAL
Qty: 120 | Refills: 3 | Status: ACTIVE | COMMUNITY

## 2022-06-08 RX ORDER — OMEPRAZOLE 40 MG/1
1 CAPSULE 30 MINUTES BEFORE MORNING MEAL BID ORALLY 90 CAPSULE, DELAYED RELEASE ORAL
Qty: 60 CAPSULE | Refills: 0 | COMMUNITY

## 2022-06-08 RX ORDER — HYDROXYZINE HYDROCHLORIDE 50 MG/1
TABLET, FILM COATED ORAL
Status: ACTIVE | COMMUNITY
Start: 2012-11-27

## 2022-06-08 RX ORDER — FAMOTIDINE 40 MG/1
1 TAB TABLET, FILM COATED ORAL BID
Qty: 60 TABLET | Refills: 11 | OUTPATIENT

## 2022-06-08 RX ORDER — ESCITALOPRAM 20 MG/1
TABLET, FILM COATED ORAL
Status: ACTIVE | COMMUNITY
Start: 2012-11-27

## 2022-06-08 RX ORDER — MORPHINE 10 MG/ML
5 DROPS TINCTURE ORAL
Qty: 300 ML | Refills: 0 | OUTPATIENT

## 2022-06-08 NOTE — HPI-TODAY'S VISIT:
Ms Cota is a 42 y/o individual, with ileal CD, currently on Humira (every week currently since ), here for follow-up of her condition, here f/u. . She is referred by Dr. Brittanie Flannery. . She was diagnosed in  with sxs of vomiting.  She had first surgery at age 5 for bowel obstruction, intestinal obstruction was found and had resection. She had the next surgery in  at [place], after presenting with obstructions.  She had ileal resection performed at this time and was diagnosed with Crohn s disease.  At that time she was started on Remicade.  She used this for about 1 year, but had minimal improvement.  She was then started on Humira since .  This has worked for her. . Previously on 10/22/2018, she reports that she continues to get frequent hospitalizations with sxs of diarrhea, nausea, vomiting, and abdominal pain.  When she has a flare, she often loses weight.  She also has shortgut syndrome.  When she has a flare, she often takes prednisone.  Her last hospitalization was in 2018.  She reports that she has 2-3 BM s per day, but this can vary depending on what she eats.  She has diffuse abdominal pain, worse if she eats, nothing makes it better or worse other than time.  Has injection site pain with Humira.  Tincture of opium is way she maintains diarrheal sxs, without it, she has continuous diarrhea. . She had a colonoscopy 10/31/2018, which was normal. . Previously on 2018, she reports that she has had some post-procedure pain and some rectal bleeding.  She has significant amount of pain on thighs after injection of Humira. . Previously on 2019, she reports that she has daily nausea, but no flares.  Zofran and Phenergan helps her the most.  We attempted to switch to every other week Humira, however, she has developed joint swelling and pain. . Previously on 3/27/2019, she has severe diarrhea.  She developed shingles and we stopped her Humira for the past 2 weeks.  She has severe nerve pain on her right chest wall, started on gabapentin by doctor.  She has up to 15 BM with incontinence.  Minimal improvement with Lomotil and opium tincture has run out due to increased dosage requirement.  Significant stress, son had seizure and other son lacerated liver after falling off bike. . Previously on 2019, she reports that she was hospitalized for nausea, vomiting and diarrhea.  Her big issues is daily nausea, however, THC (helps with diarrhea) and CBD oil helps with nausea. . Previously on 2020, pt report that she has been noticing that her BP has been elevated at home checks.  She continues to have nausea and abdominal pain.  She has nausea on a daily basis and has lost weight because of this.  CBD oils is helping with nausea, however, it impairs her mental status and prevents her from being able to drive her car.  No diarrhea.  No joint swelling, doing well on Humira. . Previously on 2020, pt reports she stopped taking her THC (when kids stopped school).  However, she got severely dehydrated due to this.  She has lost 7 lbs (was having lot of nausea and diarrhea).  Was tested for c diff.  Was started on prednisone (by ER) and was able to taper off of this.  CBD oil helps with nausea. . Previously on 2020, pt reports she had a severe flare, went to ER for severe and out of control pain, 10 on scale of 10.  Having severe radiation of sxs, nothing makes it better or worse.  Had associated nausea as well.  She was given 3 doses of IV dilaudid, steroids.  No heartburn.  Uncontrolled diarrhea.  She was given flagyl in the ER, but did not get a stool culture performed.  The flagyl has helped somewhat. . Previously on 2020, pt reports that after procedure she had severe onset of abdominal pain, nausea and vomiting.  She went to ER, they did CT scan, which was negative.  They could not get IV access on her, due to the dehydration.   Previously on 10/28/2020, pt reports she has continued to lose weight since the past month.  She has good days and bad days.  Has not been taking the CBD oil lately due to difficulty in getting it (due to COVID).   Previously on 2021 pt reports that she has been frustrated with her sxs.  She is now having GERD sxs. She modified her diet her and took epi-pen, did not help.  She is also noticing some solid dysphagia.   Previously on 2021, pt reports that she had to go to [facility] with severe n/v/diarreha and abd pain, diffuse in nature, nothing makes it better or worse.  3 weeks ago her dad  of heart attack.  Perhaps she attributes the stress of this event. THC does help her nausea sxs, only inhaled. Previously on 3/14/2022, pt reports that she had a flare in Aug and Sept in  (went to ER); had flare up in 2022 as well.  Still having some diarrhea and did lose some weight. . Today on 2022, pt reports that she has been sick for the past 6 weeks.  Her last dose of Humira was 2022.  She has lost 20lbs of weight due to severe flare. . Sees pain management for pain. . Labs: 2020: hgb 11.8, Wbc 4.3, ast 13, alt 5, iron sat 49, quant-gold (re-draw), b12 490, ferritin 109,  2019: Hgb 12.4, Ast 12, Alt 11, Cr 0.97 10/26/2018: Hgb 13.5, Cr 1.03, Ast 14, Alt 9, Iron sat 46, Ferritin 124; B12 >2000; vit D 12.2 Crp 0.2; esr 2 Humira 32  Quant-gold neg; hep B immune . FH: Aunt () with CD, brother with UC SH: No etoh/cig; disabled lives in [facility] PHM: LESTER, 2 small bowel resections, endometriosis, and scar tissue  . 2020: A Gastric, biopsy Mild chronic gastritis. No Helicobacter pylori microorganisms identified with a special stain. . B Terminal ileum, biopsy Small bowel mucosa with no significant histopathology. No histologic evidence of active or chronic ileitis.  C Random colon, biopsy Colonic mucosa with no significant histopathology. No histologic evidence of active, chronic or microscopic colitis . 10/31/2018: (Dr. Seals) - Normal mucosa in the recto-sigmoid colon, in the sigmoid colon, in the descending colon, at the splenic flexure, in the transverse colon, at the hepatic flexure and in the ascending colon.  Biopsied. - The examined portion of the ileum was normal.  Biopsied. - The ileocecal valve is normal.  Biopsied. . : Colonoscopy was normal with normal biopsies from ileum and colon

## 2022-06-12 LAB
QUANTIFERON CRITERIA: (no result)
QUANTIFERON INCUBATION: (no result)
QUANTIFERON MITOGEN VALUE: >10
QUANTIFERON NIL VALUE: 0.02
QUANTIFERON TB1 AG VALUE: 0.01
QUANTIFERON TB2 AG VALUE: 0.01
QUANTIFERON-TB GOLD PLUS: NEGATIVE

## 2022-06-20 ENCOUNTER — WEB ENCOUNTER (OUTPATIENT)
Dept: URBAN - METROPOLITAN AREA CLINIC 96 | Facility: CLINIC | Age: 42
End: 2022-06-20

## 2022-06-24 ENCOUNTER — WEB ENCOUNTER (OUTPATIENT)
Dept: URBAN - METROPOLITAN AREA CLINIC 96 | Facility: CLINIC | Age: 42
End: 2022-06-24

## 2022-06-24 RX ORDER — FAMOTIDINE 40 MG/1
1 TAB TABLET, FILM COATED ORAL BID
Qty: 60 TABLET | Refills: 11 | Status: ACTIVE | COMMUNITY

## 2022-06-24 RX ORDER — ADALIMUMAB 40MG/0.4ML
1 PEN KIT SUBCUTANEOUS
Qty: 2 | Refills: 11 | Status: ACTIVE | COMMUNITY

## 2022-06-24 RX ORDER — ESCITALOPRAM 20 MG/1
TABLET, FILM COATED ORAL
Status: ACTIVE | COMMUNITY
Start: 2012-11-27

## 2022-06-24 RX ORDER — MORPHINE 10 MG/ML
5 DROPS TINCTURE ORAL
Qty: 300 ML | Refills: 0 | Status: ACTIVE | COMMUNITY

## 2022-06-24 RX ORDER — OMEPRAZOLE 40 MG/1
1 CAPSULE 30 MINUTES BEFORE MORNING MEAL BID ORALLY 90 CAPSULE, DELAYED RELEASE ORAL
Qty: 60 CAPSULE | Refills: 0 | COMMUNITY

## 2022-06-24 RX ORDER — OMEPRAZOLE 40 MG/1
1 CAPSULE ORALLY BID CAPSULE, DELAYED RELEASE ORAL
Qty: 180 | Refills: 4 | COMMUNITY

## 2022-06-24 RX ORDER — CLONAZEPAM 0.5 MG/1
TABLET ORAL
Status: ACTIVE | COMMUNITY
Start: 2012-11-27

## 2022-06-24 RX ORDER — ONDANSETRON HYDROCHLORIDE 4 MG/1
TABLET, FILM COATED ORAL
Status: ACTIVE | COMMUNITY
Start: 2012-11-27

## 2022-06-24 RX ORDER — OXYCODONE HYDROCHLORIDE AND ACETAMINOPHEN 5; 325 MG/1; MG/1
TABLET ORAL
Status: ACTIVE | COMMUNITY
Start: 2012-11-27

## 2022-06-24 RX ORDER — NORTRIPTYLINE HYDROCHLORIDE 25 MG/1
1 CAPSULE CAPSULE ORAL QPM
Qty: 30 | Refills: 4 | COMMUNITY

## 2022-06-24 RX ORDER — COLESEVELAM HYDROCHLORIDE 625 MG/1
TABLET, FILM COATED ORAL
Status: ACTIVE | COMMUNITY
Start: 2012-11-27

## 2022-06-24 RX ORDER — OMEPRAZOLE 40 MG/1
CAPSULE, DELAYED RELEASE ORAL
Status: ACTIVE | COMMUNITY
Start: 2012-11-27

## 2022-06-24 RX ORDER — ADALIMUMAB 40MG/0.4ML
INJECT 0.4 ML UNDER THE SKIN ONCE A WEEK (WEEKLY DOSING) KIT SUBCUTANEOUS
Qty: 2 | Refills: 11 | COMMUNITY

## 2022-06-24 RX ORDER — PANTOPRAZOLE SODIUM 40 MG/1
1 TABLET TABLET, DELAYED RELEASE ORAL BID
Qty: 60 TABLET | Refills: 11 | Status: ACTIVE | COMMUNITY

## 2022-06-24 RX ORDER — HYDROXYZINE HYDROCHLORIDE 50 MG/1
TABLET, FILM COATED ORAL
Status: ACTIVE | COMMUNITY
Start: 2012-11-27

## 2022-06-24 RX ORDER — PREDNISONE 20 MG/1
TABLET ORAL
Status: ACTIVE | COMMUNITY
Start: 2012-11-27

## 2022-06-24 RX ORDER — OMEPRAZOLE 40 MG/1
1 CAPSULE CAPSULE, DELAYED RELEASE ORAL BID
Qty: 180 | Refills: 4 | COMMUNITY
Start: 2020-09-17

## 2022-06-24 RX ORDER — CYANOCOBALAMIN 1000 UG/ML
INJECTION INTRAMUSCULAR; SUBCUTANEOUS
Status: ACTIVE | COMMUNITY
Start: 2012-11-27

## 2022-06-24 RX ORDER — DIPHENOXYLATE HYDROCHLORIDE AND ATROPINE SULFATE 2.5; .025 MG/1; MG/1
1 TABLET AS NEEDED TABLET ORAL
Qty: 120 | Refills: 3 | Status: ACTIVE | COMMUNITY

## 2022-06-24 RX ORDER — MESALAMINE 1000 MG/1
1 SUPPOSITORY AT BEDTIME SUPPOSITORY RECTAL ONCE A DAY
Qty: 90 | Refills: 4 | OUTPATIENT
Start: 2022-06-29 | End: 2023-09-22

## 2022-06-24 RX ORDER — ESTRADIOL 1 MG/1
TABLET ORAL
Status: ACTIVE | COMMUNITY
Start: 2012-11-27

## 2022-06-28 ENCOUNTER — WEB ENCOUNTER (OUTPATIENT)
Dept: URBAN - METROPOLITAN AREA CLINIC 96 | Facility: CLINIC | Age: 42
End: 2022-06-28

## 2022-06-29 ENCOUNTER — WEB ENCOUNTER (OUTPATIENT)
Dept: URBAN - METROPOLITAN AREA CLINIC 96 | Facility: CLINIC | Age: 42
End: 2022-06-29

## 2022-07-02 ENCOUNTER — OUT OF OFFICE VISIT (OUTPATIENT)
Dept: URBAN - METROPOLITAN AREA MEDICAL CENTER 28 | Facility: MEDICAL CENTER | Age: 42
End: 2022-07-02
Payer: MEDICARE

## 2022-07-02 DIAGNOSIS — R19.7 ACUTE DIARRHEA: ICD-10-CM

## 2022-07-02 DIAGNOSIS — K50.90 ABDOMINAL PAIN DESPITE THERAPY FOR CROHN'S DISEASE: ICD-10-CM

## 2022-07-02 DIAGNOSIS — K29.60 ADENOPAPILLOMATOSIS GASTRICA: ICD-10-CM

## 2022-07-02 DIAGNOSIS — R11.2 ACUTE NAUSEA WITH NONBILIOUS VOMITING: ICD-10-CM

## 2022-07-02 DIAGNOSIS — K50.00 CICATRIZING ENTEROCOLITIS: ICD-10-CM

## 2022-07-02 DIAGNOSIS — R10.84 ABDOMINAL CRAMPING, GENERALIZED: ICD-10-CM

## 2022-07-02 PROCEDURE — 99222 1ST HOSP IP/OBS MODERATE 55: CPT | Performed by: INTERNAL MEDICINE

## 2022-07-02 PROCEDURE — 99232 SBSQ HOSP IP/OBS MODERATE 35: CPT | Performed by: INTERNAL MEDICINE

## 2022-07-02 PROCEDURE — 43239 EGD BIOPSY SINGLE/MULTIPLE: CPT | Performed by: INTERNAL MEDICINE

## 2022-07-02 PROCEDURE — G8427 DOCREV CUR MEDS BY ELIG CLIN: HCPCS | Performed by: INTERNAL MEDICINE

## 2022-07-02 PROCEDURE — 45380 COLONOSCOPY AND BIOPSY: CPT | Performed by: INTERNAL MEDICINE

## 2022-07-06 ENCOUNTER — OUT OF OFFICE VISIT (OUTPATIENT)
Dept: URBAN - METROPOLITAN AREA MEDICAL CENTER 28 | Facility: MEDICAL CENTER | Age: 42
End: 2022-07-06
Payer: MEDICARE

## 2022-07-06 DIAGNOSIS — R11.2 ACUTE NAUSEA WITH NONBILIOUS VOMITING: ICD-10-CM

## 2022-07-06 DIAGNOSIS — R19.7 ACUTE DIARRHEA: ICD-10-CM

## 2022-07-06 DIAGNOSIS — K50.90 ABDOMINAL PAIN DESPITE THERAPY FOR CROHN'S DISEASE: ICD-10-CM

## 2022-07-06 PROCEDURE — 99233 SBSQ HOSP IP/OBS HIGH 50: CPT | Performed by: INTERNAL MEDICINE

## 2022-07-08 ENCOUNTER — OUT OF OFFICE VISIT (OUTPATIENT)
Dept: URBAN - METROPOLITAN AREA MEDICAL CENTER 28 | Facility: MEDICAL CENTER | Age: 42
End: 2022-07-08
Payer: MEDICARE

## 2022-07-08 DIAGNOSIS — R11.2 ACUTE NAUSEA WITH NONBILIOUS VOMITING: ICD-10-CM

## 2022-07-08 DIAGNOSIS — K50.90 ABDOMINAL PAIN DESPITE THERAPY FOR CROHN'S DISEASE: ICD-10-CM

## 2022-07-08 DIAGNOSIS — R19.7 ACUTE DIARRHEA: ICD-10-CM

## 2022-07-08 PROCEDURE — 99232 SBSQ HOSP IP/OBS MODERATE 35: CPT | Performed by: INTERNAL MEDICINE

## 2022-07-09 ENCOUNTER — CLAIMS CREATED FROM THE CLAIM WINDOW (OUTPATIENT)
Dept: URBAN - METROPOLITAN AREA MEDICAL CENTER 28 | Facility: MEDICAL CENTER | Age: 42
End: 2022-07-09

## 2022-07-09 ENCOUNTER — CLAIMS CREATED FROM THE CLAIM WINDOW (OUTPATIENT)
Dept: URBAN - METROPOLITAN AREA MEDICAL CENTER 28 | Facility: MEDICAL CENTER | Age: 42
End: 2022-07-09
Payer: MEDICARE

## 2022-07-09 DIAGNOSIS — R19.7 ACUTE DIARRHEA: ICD-10-CM

## 2022-07-09 DIAGNOSIS — K50.90 ABDOMINAL PAIN DESPITE THERAPY FOR CROHN'S DISEASE: ICD-10-CM

## 2022-07-09 DIAGNOSIS — K86.81 EXOCRINE PANCREATIC INSUFFICIENCY: ICD-10-CM

## 2022-07-09 DIAGNOSIS — R11.2 ACUTE NAUSEA WITH NONBILIOUS VOMITING: ICD-10-CM

## 2022-07-09 PROCEDURE — 99232 SBSQ HOSP IP/OBS MODERATE 35: CPT | Performed by: INTERNAL MEDICINE

## 2022-07-11 ENCOUNTER — OUT OF OFFICE VISIT (OUTPATIENT)
Dept: URBAN - METROPOLITAN AREA MEDICAL CENTER 28 | Facility: MEDICAL CENTER | Age: 42
End: 2022-07-11
Payer: MEDICARE

## 2022-07-11 DIAGNOSIS — K50.90 ABDOMINAL PAIN DESPITE THERAPY FOR CROHN'S DISEASE: ICD-10-CM

## 2022-07-11 DIAGNOSIS — R19.7 ACUTE DIARRHEA: ICD-10-CM

## 2022-07-11 DIAGNOSIS — R11.2 ACUTE NAUSEA WITH NONBILIOUS VOMITING: ICD-10-CM

## 2022-07-11 PROCEDURE — 99232 SBSQ HOSP IP/OBS MODERATE 35: CPT | Performed by: INTERNAL MEDICINE

## 2022-07-12 ENCOUNTER — WEB ENCOUNTER (OUTPATIENT)
Dept: URBAN - METROPOLITAN AREA CLINIC 96 | Facility: CLINIC | Age: 42
End: 2022-07-12

## 2022-07-12 RX ORDER — MORPHINE 10 MG/ML
5 DROPS TINCTURE ORAL
Qty: 300 ML | Refills: 0 | Status: ACTIVE | COMMUNITY

## 2022-07-12 RX ORDER — ADALIMUMAB 40MG/0.4ML
INJECT 0.4 ML UNDER THE SKIN ONCE A WEEK (WEEKLY DOSING) KIT SUBCUTANEOUS
Qty: 2 | Refills: 11 | COMMUNITY

## 2022-07-12 RX ORDER — OMEPRAZOLE 40 MG/1
CAPSULE, DELAYED RELEASE ORAL
Status: ACTIVE | COMMUNITY
Start: 2012-11-27

## 2022-07-12 RX ORDER — DIPHENOXYLATE HYDROCHLORIDE AND ATROPINE SULFATE 2.5; .025 MG/1; MG/1
1 TABLET AS NEEDED TABLET ORAL
Qty: 120 | Refills: 3 | Status: ACTIVE | COMMUNITY

## 2022-07-12 RX ORDER — PROMETHAZINE HYDROCHLORIDE 25 MG/1
1 TABLET AS NEEDED TABLET ORAL
Qty: 60 | Refills: 6 | OUTPATIENT

## 2022-07-12 RX ORDER — NORTRIPTYLINE HYDROCHLORIDE 25 MG/1
1 CAPSULE CAPSULE ORAL QPM
Qty: 30 | Refills: 4 | COMMUNITY

## 2022-07-12 RX ORDER — ONDANSETRON HYDROCHLORIDE 4 MG/1
TABLET, FILM COATED ORAL
Status: ACTIVE | COMMUNITY
Start: 2012-11-27

## 2022-07-12 RX ORDER — FAMOTIDINE 40 MG/1
1 TAB TABLET, FILM COATED ORAL BID
Qty: 60 TABLET | Refills: 11 | Status: ACTIVE | COMMUNITY

## 2022-07-12 RX ORDER — OMEPRAZOLE 40 MG/1
1 CAPSULE 30 MINUTES BEFORE MORNING MEAL BID ORALLY 90 CAPSULE, DELAYED RELEASE ORAL
Qty: 60 CAPSULE | Refills: 0 | COMMUNITY

## 2022-07-12 RX ORDER — SCOPOLAMINE 1 MG/3D
1 PATCH TO SKIN BEHIND THE EAR AS NEEDED PATCH TRANSDERMAL
Qty: 10 | Refills: 6 | OUTPATIENT
Start: 2022-07-13 | End: 2023-02-07

## 2022-07-12 RX ORDER — PREDNISONE 20 MG/1
TABLET ORAL
Status: ACTIVE | COMMUNITY
Start: 2012-11-27

## 2022-07-12 RX ORDER — ADALIMUMAB 40MG/0.4ML
1 PEN KIT SUBCUTANEOUS
Qty: 2 | Refills: 11 | Status: ACTIVE | COMMUNITY

## 2022-07-12 RX ORDER — CYANOCOBALAMIN 1000 UG/ML
INJECTION INTRAMUSCULAR; SUBCUTANEOUS
Status: ACTIVE | COMMUNITY
Start: 2012-11-27

## 2022-07-12 RX ORDER — HYDROXYZINE HYDROCHLORIDE 50 MG/1
TABLET, FILM COATED ORAL
Status: ACTIVE | COMMUNITY
Start: 2012-11-27

## 2022-07-12 RX ORDER — ESTRADIOL 1 MG/1
TABLET ORAL
Status: ACTIVE | COMMUNITY
Start: 2012-11-27

## 2022-07-12 RX ORDER — COLESEVELAM HYDROCHLORIDE 625 MG/1
TABLET, FILM COATED ORAL
Status: ACTIVE | COMMUNITY
Start: 2012-11-27

## 2022-07-12 RX ORDER — OXYCODONE HYDROCHLORIDE AND ACETAMINOPHEN 5; 325 MG/1; MG/1
TABLET ORAL
Status: ACTIVE | COMMUNITY
Start: 2012-11-27

## 2022-07-12 RX ORDER — ESCITALOPRAM 20 MG/1
TABLET, FILM COATED ORAL
Status: ACTIVE | COMMUNITY
Start: 2012-11-27

## 2022-07-12 RX ORDER — OMEPRAZOLE 40 MG/1
1 CAPSULE ORALLY BID CAPSULE, DELAYED RELEASE ORAL
Qty: 180 | Refills: 4 | COMMUNITY

## 2022-07-12 RX ORDER — MESALAMINE 1000 MG/1
1 SUPPOSITORY AT BEDTIME SUPPOSITORY RECTAL ONCE A DAY
Qty: 90 | Refills: 4 | Status: ACTIVE | COMMUNITY
Start: 2022-06-29 | End: 2023-09-22

## 2022-07-12 RX ORDER — CLONAZEPAM 0.5 MG/1
TABLET ORAL
Status: ACTIVE | COMMUNITY
Start: 2012-11-27

## 2022-07-12 RX ORDER — PANTOPRAZOLE SODIUM 40 MG/1
1 TABLET TABLET, DELAYED RELEASE ORAL BID
Qty: 60 TABLET | Refills: 11 | Status: ACTIVE | COMMUNITY

## 2022-07-12 RX ORDER — OMEPRAZOLE 40 MG/1
1 CAPSULE CAPSULE, DELAYED RELEASE ORAL BID
Qty: 180 | Refills: 4 | COMMUNITY
Start: 2020-09-17

## 2022-07-13 ENCOUNTER — WEB ENCOUNTER (OUTPATIENT)
Dept: URBAN - METROPOLITAN AREA CLINIC 96 | Facility: CLINIC | Age: 42
End: 2022-07-13

## 2022-07-14 ENCOUNTER — WEB ENCOUNTER (OUTPATIENT)
Dept: URBAN - METROPOLITAN AREA CLINIC 96 | Facility: CLINIC | Age: 42
End: 2022-07-14

## 2022-07-15 ENCOUNTER — WEB ENCOUNTER (OUTPATIENT)
Dept: URBAN - METROPOLITAN AREA CLINIC 96 | Facility: CLINIC | Age: 42
End: 2022-07-15

## 2022-07-20 ENCOUNTER — TELEPHONE ENCOUNTER (OUTPATIENT)
Dept: URBAN - METROPOLITAN AREA CLINIC 92 | Facility: CLINIC | Age: 42
End: 2022-07-20

## 2022-07-28 ENCOUNTER — TELEPHONE ENCOUNTER (OUTPATIENT)
Dept: URBAN - METROPOLITAN AREA CLINIC 92 | Facility: CLINIC | Age: 42
End: 2022-07-28

## 2022-08-17 ENCOUNTER — OFFICE VISIT (OUTPATIENT)
Dept: URBAN - METROPOLITAN AREA TELEHEALTH 2 | Facility: TELEHEALTH | Age: 42
End: 2022-08-17

## 2022-09-25 ENCOUNTER — OUT OF OFFICE VISIT (OUTPATIENT)
Dept: URBAN - METROPOLITAN AREA MEDICAL CENTER 12 | Facility: MEDICAL CENTER | Age: 42
End: 2022-09-25
Payer: MEDICARE

## 2022-09-25 DIAGNOSIS — K50.90 ABDOMINAL PAIN DESPITE THERAPY FOR CROHN'S DISEASE: ICD-10-CM

## 2022-09-25 DIAGNOSIS — R19.7 ACUTE DIARRHEA: ICD-10-CM

## 2022-09-25 DIAGNOSIS — R10.84 ABDOMINAL CRAMPING, GENERALIZED: ICD-10-CM

## 2022-09-25 DIAGNOSIS — R74.8 ABNORMAL ALKALINE PHOSPHATASE TEST: ICD-10-CM

## 2022-09-25 PROCEDURE — 99221 1ST HOSP IP/OBS SF/LOW 40: CPT | Performed by: PHYSICIAN ASSISTANT

## 2022-09-25 PROCEDURE — G8427 DOCREV CUR MEDS BY ELIG CLIN: HCPCS | Performed by: PHYSICIAN ASSISTANT

## 2022-09-26 ENCOUNTER — OUT OF OFFICE VISIT (OUTPATIENT)
Dept: URBAN - METROPOLITAN AREA MEDICAL CENTER 12 | Facility: MEDICAL CENTER | Age: 42
End: 2022-09-26
Payer: MEDICARE

## 2022-09-26 DIAGNOSIS — R10.84 ABDOMINAL CRAMPING, GENERALIZED: ICD-10-CM

## 2022-09-26 DIAGNOSIS — K50.90 ABDOMINAL PAIN DESPITE THERAPY FOR CROHN'S DISEASE: ICD-10-CM

## 2022-09-26 DIAGNOSIS — R19.7 ACUTE DIARRHEA: ICD-10-CM

## 2022-09-26 DIAGNOSIS — R93.2 ABN FIND-BILIARY TRACT: ICD-10-CM

## 2022-09-26 PROCEDURE — 99232 SBSQ HOSP IP/OBS MODERATE 35: CPT | Performed by: STUDENT IN AN ORGANIZED HEALTH CARE EDUCATION/TRAINING PROGRAM

## 2022-10-06 ENCOUNTER — OUT OF OFFICE VISIT (OUTPATIENT)
Dept: URBAN - METROPOLITAN AREA MEDICAL CENTER 12 | Facility: MEDICAL CENTER | Age: 42
End: 2022-10-06
Payer: MEDICARE

## 2022-10-06 DIAGNOSIS — K29.60 ADENOPAPILLOMATOSIS GASTRICA: ICD-10-CM

## 2022-10-06 DIAGNOSIS — K50.90 ABDOMINAL PAIN DESPITE THERAPY FOR CROHN'S DISEASE: ICD-10-CM

## 2022-10-06 DIAGNOSIS — R63.39 OTHER FEEDING DIFFICULTIES: ICD-10-CM

## 2022-10-06 PROCEDURE — 43241 EGD TUBE/CATH INSERTION: CPT | Performed by: INTERNAL MEDICINE

## 2022-10-06 PROCEDURE — 43239 EGD BIOPSY SINGLE/MULTIPLE: CPT | Performed by: INTERNAL MEDICINE

## 2022-10-12 ENCOUNTER — OFFICE VISIT (OUTPATIENT)
Dept: URBAN - METROPOLITAN AREA CLINIC 40 | Facility: CLINIC | Age: 42
End: 2022-10-12

## 2022-11-15 ENCOUNTER — P2P PATIENT RECORD (OUTPATIENT)
Age: 42
End: 2022-11-15

## 2022-11-16 ENCOUNTER — P2P PATIENT RECORD (OUTPATIENT)
Age: 42
End: 2022-11-16

## 2022-12-13 ENCOUNTER — OFFICE VISIT (OUTPATIENT)
Dept: URBAN - METROPOLITAN AREA CLINIC 17 | Facility: CLINIC | Age: 42
End: 2022-12-13

## 2022-12-13 RX ORDER — ADALIMUMAB 40MG/0.4ML
1 PEN KIT SUBCUTANEOUS
Qty: 2 | Refills: 11 | COMMUNITY

## 2022-12-13 RX ORDER — OMEPRAZOLE 40 MG/1
1 CAPSULE 30 MINUTES BEFORE MORNING MEAL BID ORALLY 90 CAPSULE, DELAYED RELEASE ORAL
Qty: 60 CAPSULE | Refills: 0 | COMMUNITY

## 2022-12-13 RX ORDER — MESALAMINE 1000 MG/1
1 SUPPOSITORY AT BEDTIME SUPPOSITORY RECTAL ONCE A DAY
Qty: 90 | Refills: 4 | COMMUNITY
Start: 2022-06-29 | End: 2023-09-22

## 2022-12-13 RX ORDER — PROMETHAZINE HYDROCHLORIDE 25 MG/1
1 TABLET AS NEEDED TABLET ORAL
Qty: 60 | Refills: 6 | COMMUNITY

## 2022-12-13 RX ORDER — COLESEVELAM HYDROCHLORIDE 625 MG/1
TABLET, FILM COATED ORAL
COMMUNITY
Start: 2012-11-27

## 2022-12-13 RX ORDER — OXYCODONE HYDROCHLORIDE AND ACETAMINOPHEN 5; 325 MG/1; MG/1
TABLET ORAL
COMMUNITY
Start: 2012-11-27

## 2022-12-13 RX ORDER — ESCITALOPRAM 20 MG/1
TABLET, FILM COATED ORAL
COMMUNITY
Start: 2012-11-27

## 2022-12-13 RX ORDER — PREDNISONE 20 MG/1
TABLET ORAL
COMMUNITY
Start: 2012-11-27

## 2022-12-13 RX ORDER — OMEPRAZOLE 40 MG/1
1 CAPSULE CAPSULE, DELAYED RELEASE ORAL BID
Qty: 180 | Refills: 4 | COMMUNITY
Start: 2020-09-17

## 2022-12-13 RX ORDER — ONDANSETRON HYDROCHLORIDE 4 MG/1
TABLET, FILM COATED ORAL
COMMUNITY
Start: 2012-11-27

## 2022-12-13 RX ORDER — OMEPRAZOLE 40 MG/1
1 CAPSULE ORALLY BID CAPSULE, DELAYED RELEASE ORAL
Qty: 180 | Refills: 4 | COMMUNITY

## 2022-12-13 RX ORDER — HYDROXYZINE HYDROCHLORIDE 50 MG/1
TABLET, FILM COATED ORAL
COMMUNITY
Start: 2012-11-27

## 2022-12-13 RX ORDER — OMEPRAZOLE 40 MG/1
CAPSULE, DELAYED RELEASE ORAL
COMMUNITY
Start: 2012-11-27

## 2022-12-13 RX ORDER — FAMOTIDINE 40 MG/1
1 TAB TABLET, FILM COATED ORAL BID
Qty: 60 TABLET | Refills: 11 | COMMUNITY

## 2022-12-13 RX ORDER — DIPHENOXYLATE HYDROCHLORIDE AND ATROPINE SULFATE 2.5; .025 MG/1; MG/1
1 TABLET AS NEEDED TABLET ORAL
Qty: 120 | Refills: 3 | COMMUNITY

## 2022-12-13 RX ORDER — CLONAZEPAM 0.5 MG/1
TABLET ORAL
COMMUNITY
Start: 2012-11-27

## 2022-12-13 RX ORDER — NORTRIPTYLINE HYDROCHLORIDE 25 MG/1
1 CAPSULE CAPSULE ORAL QPM
Qty: 30 | Refills: 4 | COMMUNITY

## 2022-12-13 RX ORDER — ESTRADIOL 1 MG/1
TABLET ORAL
COMMUNITY
Start: 2012-11-27

## 2022-12-13 RX ORDER — CYANOCOBALAMIN 1000 UG/ML
INJECTION INTRAMUSCULAR; SUBCUTANEOUS
COMMUNITY
Start: 2012-11-27

## 2022-12-13 RX ORDER — PANTOPRAZOLE SODIUM 40 MG/1
1 TABLET TABLET, DELAYED RELEASE ORAL BID
Qty: 60 TABLET | Refills: 11 | COMMUNITY

## 2022-12-13 RX ORDER — MORPHINE 10 MG/ML
5 DROPS TINCTURE ORAL
Qty: 300 ML | Refills: 0 | COMMUNITY

## 2022-12-13 RX ORDER — SCOPOLAMINE 1 MG/3D
1 PATCH TO SKIN BEHIND THE EAR AS NEEDED PATCH TRANSDERMAL
Qty: 10 | Refills: 6 | COMMUNITY
Start: 2022-07-13 | End: 2023-02-07

## 2022-12-13 RX ORDER — ADALIMUMAB 40MG/0.4ML
INJECT 0.4 ML UNDER THE SKIN ONCE A WEEK (WEEKLY DOSING) KIT SUBCUTANEOUS
Qty: 2 | Refills: 11 | COMMUNITY

## 2023-02-28 ENCOUNTER — WEB ENCOUNTER (OUTPATIENT)
Dept: URBAN - METROPOLITAN AREA CLINIC 17 | Facility: CLINIC | Age: 43
End: 2023-02-28

## 2023-02-28 ENCOUNTER — DASHBOARD ENCOUNTERS (OUTPATIENT)
Age: 43
End: 2023-02-28

## 2023-02-28 ENCOUNTER — OFFICE VISIT (OUTPATIENT)
Dept: URBAN - METROPOLITAN AREA CLINIC 17 | Facility: CLINIC | Age: 43
End: 2023-02-28
Payer: MEDICARE

## 2023-02-28 VITALS
HEIGHT: 65 IN | DIASTOLIC BLOOD PRESSURE: 90 MMHG | TEMPERATURE: 97.1 F | HEART RATE: 97 BPM | SYSTOLIC BLOOD PRESSURE: 143 MMHG | BODY MASS INDEX: 18.99 KG/M2 | WEIGHT: 114 LBS

## 2023-02-28 DIAGNOSIS — R19.7 DIARRHEA, UNSPECIFIED: ICD-10-CM

## 2023-02-28 DIAGNOSIS — K90.9 INTESTINAL MALABSORPTION, UNSPECIFIED: ICD-10-CM

## 2023-02-28 DIAGNOSIS — Z93.1 G TUBE FEEDINGS: ICD-10-CM

## 2023-02-28 DIAGNOSIS — K50.90 CROHN'S DISEASE, UNSPECIFIED, WITHOUT COMPLICATIONS: ICD-10-CM

## 2023-02-28 DIAGNOSIS — E61.1 IRON DEFICIENCY: ICD-10-CM

## 2023-02-28 PROBLEM — 302109006: Status: ACTIVE | Noted: 2023-02-28

## 2023-02-28 PROBLEM — 62315008: Status: ACTIVE | Noted: 2023-02-28

## 2023-02-28 PROCEDURE — 99214 OFFICE O/P EST MOD 30 MIN: CPT | Performed by: INTERNAL MEDICINE

## 2023-02-28 RX ORDER — ONDANSETRON HYDROCHLORIDE 4 MG/1
TABLET, FILM COATED ORAL
Status: ACTIVE | COMMUNITY
Start: 2012-11-27

## 2023-02-28 RX ORDER — MORPHINE 10 MG/ML
5 DROPS TINCTURE ORAL
Qty: 300 ML | Refills: 0 | Status: ACTIVE | COMMUNITY

## 2023-02-28 RX ORDER — OXYCODONE HYDROCHLORIDE AND ACETAMINOPHEN 5; 325 MG/1; MG/1
TABLET ORAL
Status: ACTIVE | COMMUNITY
Start: 2012-11-27

## 2023-02-28 RX ORDER — MESALAMINE 1000 MG/1
1 SUPPOSITORY AT BEDTIME SUPPOSITORY RECTAL ONCE A DAY
Qty: 90 | Refills: 4 | Status: ACTIVE | COMMUNITY
Start: 2022-06-29 | End: 2023-09-22

## 2023-02-28 RX ORDER — OMEPRAZOLE 40 MG/1
1 CAPSULE 30 MINUTES BEFORE MORNING MEAL BID ORALLY 90 CAPSULE, DELAYED RELEASE ORAL
Qty: 60 CAPSULE | Refills: 0 | Status: ON HOLD | COMMUNITY

## 2023-02-28 RX ORDER — ESCITALOPRAM 20 MG/1
TABLET, FILM COATED ORAL
Status: ON HOLD | COMMUNITY
Start: 2012-11-27

## 2023-02-28 RX ORDER — CLONAZEPAM 0.5 MG/1
TABLET ORAL
Status: ACTIVE | COMMUNITY
Start: 2012-11-27

## 2023-02-28 RX ORDER — PREDNISONE 20 MG/1
TABLET ORAL
Status: ON HOLD | COMMUNITY
Start: 2012-11-27

## 2023-02-28 RX ORDER — ESTRADIOL 1 MG/1
TABLET ORAL
Status: ACTIVE | COMMUNITY
Start: 2012-11-27

## 2023-02-28 RX ORDER — COLESEVELAM HYDROCHLORIDE 625 MG/1
TABLET, FILM COATED ORAL
Status: ON HOLD | COMMUNITY
Start: 2012-11-27

## 2023-02-28 RX ORDER — ADALIMUMAB 40MG/0.4ML
INJECT 0.4 ML UNDER THE SKIN ONCE A WEEK (WEEKLY DOSING) KIT SUBCUTANEOUS
Qty: 2 | Refills: 11 | Status: ACTIVE | COMMUNITY

## 2023-02-28 RX ORDER — DIPHENOXYLATE HYDROCHLORIDE AND ATROPINE SULFATE 2.5; .025 MG/1; MG/1
1 TABLET AS NEEDED TABLET ORAL
Qty: 120 | Refills: 3 | Status: ACTIVE | COMMUNITY

## 2023-02-28 RX ORDER — ADALIMUMAB 40MG/0.4ML
1 PEN KIT SUBCUTANEOUS
Qty: 2 | Refills: 11 | Status: ACTIVE | COMMUNITY

## 2023-02-28 RX ORDER — OMEPRAZOLE 40 MG/1
1 CAPSULE CAPSULE, DELAYED RELEASE ORAL BID
Qty: 180 | Refills: 4 | Status: ON HOLD | COMMUNITY
Start: 2020-09-17

## 2023-02-28 RX ORDER — NORTRIPTYLINE HYDROCHLORIDE 25 MG/1
1 CAPSULE CAPSULE ORAL QPM
Qty: 30 | Refills: 4 | Status: ACTIVE | COMMUNITY

## 2023-02-28 RX ORDER — PROMETHAZINE HYDROCHLORIDE 25 MG/1
1 TABLET AS NEEDED TABLET ORAL
Qty: 60 | Refills: 6 | Status: ACTIVE | COMMUNITY

## 2023-02-28 RX ORDER — PANTOPRAZOLE SODIUM 40 MG/1
1 TABLET TABLET, DELAYED RELEASE ORAL BID
Qty: 60 TABLET | Refills: 11 | Status: ON HOLD | COMMUNITY

## 2023-02-28 RX ORDER — HYDROXYZINE HYDROCHLORIDE 50 MG/1
TABLET, FILM COATED ORAL
Status: ACTIVE | COMMUNITY
Start: 2012-11-27

## 2023-02-28 RX ORDER — OMEPRAZOLE 40 MG/1
1 CAPSULE ORALLY BID CAPSULE, DELAYED RELEASE ORAL
Qty: 180 | Refills: 4 | Status: ON HOLD | COMMUNITY

## 2023-02-28 RX ORDER — CYANOCOBALAMIN 1000 UG/ML
INJECTION INTRAMUSCULAR; SUBCUTANEOUS
Status: ACTIVE | COMMUNITY
Start: 2012-11-27

## 2023-02-28 RX ORDER — OMEPRAZOLE 40 MG/1
CAPSULE, DELAYED RELEASE ORAL
Status: ON HOLD | COMMUNITY
Start: 2012-11-27

## 2023-02-28 RX ORDER — FAMOTIDINE 40 MG/1
1 TAB TABLET, FILM COATED ORAL BID
Qty: 60 TABLET | Refills: 11 | Status: ON HOLD | COMMUNITY

## 2023-02-28 NOTE — HPI-OTHER HISTORIES
(Dr Seals: 2022) Cipriano is a 42 y/o individual, with ileal CD, currently on Humira (every week currently since ), here for follow-up of her condition, here f/u. . She is referred by Dr. Brittanie Flannery. . She was diagnosed in  with sxs of vomiting.  She had first surgery at age 5 for bowel obstruction, intestinal obstruction was found and had resection. She had the next surgery in  at [facility], after presenting with obstructions.  She had ileal resection performed at this time and was diagnosed with Crohn s disease.  At that time she was started on Remicade.  She used this for about 1 year, but had minimal improvement.  She was then started on Humira since .  This has worked for her. . Previously on 10/22/2018, she reports that she continues to get frequent hospitalizations with sxs of diarrhea, nausea, vomiting, and abdominal pain.  When she has a flare, she often loses weight.  She also has shortgut syndrome.  When she has a flare, she often takes prednisone.  Her last hospitalization was in 2018.  She reports that she has 2-3 BM s per day, but this can vary depending on what she eats.  She has diffuse abdominal pain, worse if she eats, nothing makes it better or worse other than time.  Has injection site pain with Humira.  Tincture of opium is way she maintains diarrheal sxs, without it, she has continuous diarrhea. . She had a colonoscopy 10/31/2018, which was normal. . Previously on 2018, she reports that she has had some post-procedure pain and some rectal bleeding.  She has significant amount of pain on thighs after injection of Humira. . Previously on 2019, she reports that she has daily nausea, but no flares.  Zofran and Phenergan helps her the most.  We attempted to switch to every other week Humira, however, she has developed joint swelling and pain. . Previously on 3/27/2019, she has severe diarrhea.  She developed shingles and we stopped her Humira for the past 2 weeks.  She has severe nerve pain on her right chest wall, started on gabapentin by doctor.  She has up to 15 BM with incontinence.  Minimal improvement with Lomotil and opium tincture has run out due to increased dosage requirement.  Significant stress, son had seizure and other son lacerated liver after falling off bike. . Previously on 2019, she reports that she was hospitalized for nausea, vomiting and diarrhea.  Her big issues is daily nausea, however, THC (helps with diarrhea) and CBD oil helps with nausea. . Previously on 2020, pt report that she has been noticing that her BP has been elevated at home checks.  She continues to have nausea and abdominal pain.  She has nausea on a daily basis and has lost weight because of this.  CBD oils is helping with nausea, however, it impairs her mental status and prevents her from being able to drive her car.  No diarrhea.  No joint swelling, doing well on Humira. . Previously on 2020, pt reports she stopped taking her THC (when kids stopped school).  However, she got severely dehydrated due to this.  She has lost 7 lbs (was having lot of nausea and diarrhea).  Was tested for c diff.  Was started on prednisone (by ER) and was able to taper off of this.  CBD oil helps with nausea. . Previously on 2020, pt reports she had a severe flare, went to ER for severe and out of control pain, 10 on scale of 10.  Having severe radiation of sxs, nothing makes it better or worse.  Had associated nausea as well.  She was given 3 doses of IV dilaudid, steroids.  No heartburn.  Uncontrolled diarrhea.  She was given flagyl in the ER, but did not get a stool culture performed.  The flagyl has helped somewhat. . Previously on 2020, pt reports that after procedure she had severe onset of abdominal pain, nausea and vomiting.  She went to ER, they did CT scan, which was negative.  They could not get IV access on her, due to the dehydration.   Previously on 10/28/2020, pt reports she has continued to lose weight since the past month.  She has good days and bad days.  Has not been taking the CBD oil lately due to difficulty in getting it (due to COVID).   Previously on 2021 pt reports that she has been frustrated with her sxs.  She is now having GERD sxs. She modified her diet her and took epi-pen, did not help.  She is also noticing some solid dysphagia.   Previously on 2021, pt reports that she had to go to [facility] with severe n/v/diarreha and abd pain, diffuse in nature, nothing makes it better or worse.  3 weeks ago her dad  of heart attack.  Perhaps she attributes the stress of this event. THC does help her nausea sxs, only inhaled. Previously on 3/14/2022, pt reports that she had a flare in Aug and Sept in  (went to ER); had flare up in 2022 as well.  Still having some diarrhea and did lose some weight. . Today on 2022, pt reports that she has been sick for the past 6 weeks.  Her last dose of Humira was 2022.  She has lost 20lbs of weight due to severe flare. . Sees pain management for pain. . Labs: 2020: hgb 11.8, Wbc 4.3, ast 13, alt 5, iron sat 49, quant-gold (re-draw), b12 490, ferritin 109,  2019: Hgb 12.4, Ast 12, Alt 11, Cr 0.97 10/26/2018: Hgb 13.5, Cr 1.03, Ast 14, Alt 9, Iron sat 46, Ferritin 124; B12 >2000; vit D 12.2 Crp 0.2; esr 2 Humira 32  Quant-gold neg; hep B immune . FH: Aunt () with CD, brother with UC SH: No etoh/cig; disabled lives in [facility] PHM: LESTER, 2 small bowel resections, endometriosis, and scar tissue  . 2020: A Gastric, biopsy Mild chronic gastritis. No Helicobacter pylori microorganisms identified with a special stain. . B Terminal ileum, biopsy Small bowel mucosa with no significant histopathology. No histologic evidence of active or chronic ileitis.  C Random colon, biopsy Colonic mucosa with no significant histopathology. No histologic evidence of active, chronic or microscopic colitis  10/31/2018: (Dr. Seals) - Normal mucosa in the recto-sigmoid colon, in the sigmoid colon, in the descending colon, at the splenic flexure, in the transverse colon, at the hepatic flexure and in the ascending colon.  Biopsied. - The examined portion of the ileum was normal.  Biopsied. - The ileocecal valve is normal.  Biopsied.  : Colonoscopy was normal with normal biopsies from ileum and colon

## 2023-02-28 NOTE — PHYSICAL EXAM GASTROINTESTINAL
Abdomen,  soft, nontender, nondistended,  normal bowel sounds  G tube intact,  Liver and Spleen,  no hepatomegaly present

## 2023-02-28 NOTE — HPI-TODAY'S VISIT:
Ms Cota is  a 43 yo female with Crohns disease here for follow up.  She is here for follow up of an [facility] hospitalization in 2022.  She had a G tube placed at [facility].  The patient has been going to the [facility] in [facility] every 4 months since November 2022.  She is very happy with the care she has been receiving there.  The [facility] in [place] has recommended a daily microbiom smoothie which has slowed down the diarrhea.  She is still on her G tube feeling 9 pm - 9 am.  She is now gaining weight. Last year she wa 97 pounds and now she is 114 pounds.  She is on tinture of opium prescribed by Dr. Seals last year.  She ran out and is ow requesting a refill.  She is on weekly Humira which she has been on since 2017.  Labs done by [facility] last month are for detection of drug levels and ABS.  She will follow up the results in 2 weeks.  She will have an EGD/colonoscopy done at that time.   A pill was done a few months ago. IT is not clear is she has dumping syndrome vs short gut.  Bowel movements have decreased to 3 loose bowel movements a day.  She continues to have severe abdominal cramps with meals.

## 2023-02-28 NOTE — PHYSICAL EXAM RECTAL:
normal tone, no external hemorrhoids, no masses palpable, no red blood, Tenderness on CHARLI, Internal hemorrhoids present

## 2023-03-20 ENCOUNTER — WEB ENCOUNTER (OUTPATIENT)
Dept: URBAN - METROPOLITAN AREA CLINIC 17 | Facility: CLINIC | Age: 43
End: 2023-03-20

## 2023-03-21 ENCOUNTER — P2P PATIENT RECORD (OUTPATIENT)
Age: 43
End: 2023-03-21

## 2023-08-22 ENCOUNTER — OFFICE VISIT (OUTPATIENT)
Dept: URBAN - METROPOLITAN AREA CLINIC 17 | Facility: CLINIC | Age: 43
End: 2023-08-22